# Patient Record
Sex: MALE | Race: BLACK OR AFRICAN AMERICAN | NOT HISPANIC OR LATINO | Employment: OTHER | URBAN - METROPOLITAN AREA
[De-identification: names, ages, dates, MRNs, and addresses within clinical notes are randomized per-mention and may not be internally consistent; named-entity substitution may affect disease eponyms.]

---

## 2018-03-19 ENCOUNTER — HISTORICAL (OUTPATIENT)
Dept: INTERNAL MEDICINE | Facility: CLINIC | Age: 58
End: 2018-03-19

## 2018-09-19 ENCOUNTER — HISTORICAL (OUTPATIENT)
Dept: INTERNAL MEDICINE | Facility: CLINIC | Age: 58
End: 2018-09-19

## 2019-03-20 ENCOUNTER — HISTORICAL (OUTPATIENT)
Dept: INTERNAL MEDICINE | Facility: CLINIC | Age: 59
End: 2019-03-20

## 2019-03-20 LAB
ABS NEUT (OLG): 4.54 X10(3)/MCL (ref 2.1–9.2)
ALBUMIN SERPL-MCNC: 3.6 GM/DL (ref 3.4–5)
ALBUMIN/GLOB SERPL: 0.9 RATIO (ref 1.1–2)
ALP SERPL-CCNC: 68 UNIT/L (ref 45–117)
ALT SERPL-CCNC: 19 UNIT/L (ref 12–78)
AST SERPL-CCNC: 14 UNIT/L (ref 15–37)
BASOPHILS # BLD AUTO: 0.04 X10(3)/MCL
BASOPHILS NFR BLD AUTO: 0 %
BILIRUB SERPL-MCNC: 0.3 MG/DL (ref 0.2–1)
BILIRUBIN DIRECT+TOT PNL SERPL-MCNC: <0.1 MG/DL
BILIRUBIN DIRECT+TOT PNL SERPL-MCNC: ABNORMAL MG/DL
BUN SERPL-MCNC: 21 MG/DL (ref 7–18)
CALCIUM SERPL-MCNC: 8.6 MG/DL (ref 8.5–10.1)
CHLORIDE SERPL-SCNC: 105 MMOL/L (ref 98–107)
CHOLEST SERPL-MCNC: 129 MG/DL
CHOLEST/HDLC SERPL: 2.6 {RATIO} (ref 0–5)
CO2 SERPL-SCNC: 30 MMOL/L (ref 21–32)
CREAT SERPL-MCNC: 1.2 MG/DL (ref 0.6–1.3)
EOSINOPHIL # BLD AUTO: 0.65 10*3/UL
EOSINOPHIL NFR BLD AUTO: 9 %
ERYTHROCYTE [DISTWIDTH] IN BLOOD BY AUTOMATED COUNT: 15.3 % (ref 11.5–14.5)
EST. AVERAGE GLUCOSE BLD GHB EST-MCNC: 114 MG/DL
GLOBULIN SER-MCNC: 3.9 GM/ML (ref 2.3–3.5)
GLUCOSE SERPL-MCNC: 101 MG/DL (ref 74–106)
HBA1C MFR BLD: 5.6 % (ref 4.2–6.3)
HCT VFR BLD AUTO: 45.2 % (ref 40–51)
HDLC SERPL-MCNC: 49 MG/DL
HGB BLD-MCNC: 14.3 GM/DL (ref 13.5–17.5)
IMM GRANULOCYTES # BLD AUTO: 0.04 10*3/UL
IMM GRANULOCYTES NFR BLD AUTO: 0 %
LDLC SERPL CALC-MCNC: 64 MG/DL (ref 0–130)
LYMPHOCYTES # BLD AUTO: 1.52 X10(3)/MCL
LYMPHOCYTES NFR BLD AUTO: 20 % (ref 13–40)
MCH RBC QN AUTO: 25.8 PG (ref 26–34)
MCHC RBC AUTO-ENTMCNC: 31.6 GM/DL (ref 31–37)
MCV RBC AUTO: 81.4 FL (ref 80–100)
MONOCYTES # BLD AUTO: 0.78 X10(3)/MCL
MONOCYTES NFR BLD AUTO: 10 % (ref 4–12)
NEUTROPHILS # BLD AUTO: 4.54 X10(3)/MCL
NEUTROPHILS NFR BLD AUTO: 60 X10(3)/MCL
PLATELET # BLD AUTO: 286 X10(3)/MCL (ref 130–400)
PMV BLD AUTO: 11.3 FL (ref 7.4–10.4)
POTASSIUM SERPL-SCNC: 4.2 MMOL/L (ref 3.5–5.1)
PROT SERPL-MCNC: 7.5 GM/DL (ref 6.4–8.2)
RBC # BLD AUTO: 5.55 X10(6)/MCL (ref 4.5–5.9)
SODIUM SERPL-SCNC: 139 MMOL/L (ref 136–145)
TRIGL SERPL-MCNC: 80 MG/DL
TSH SERPL-ACNC: 2.43 MIU/L (ref 0.36–3.74)
VLDLC SERPL CALC-MCNC: 16 MG/DL
WBC # SPEC AUTO: 7.6 X10(3)/MCL (ref 4.5–11)

## 2019-03-27 ENCOUNTER — HISTORICAL (OUTPATIENT)
Dept: ADMINISTRATIVE | Facility: HOSPITAL | Age: 59
End: 2019-03-27

## 2019-07-30 ENCOUNTER — HISTORICAL (OUTPATIENT)
Dept: ADMINISTRATIVE | Facility: HOSPITAL | Age: 59
End: 2019-07-30

## 2019-07-30 ENCOUNTER — HISTORICAL (OUTPATIENT)
Dept: LAB | Facility: HOSPITAL | Age: 59
End: 2019-07-30

## 2019-09-18 ENCOUNTER — HISTORICAL (OUTPATIENT)
Dept: INTERNAL MEDICINE | Facility: CLINIC | Age: 59
End: 2019-09-18

## 2019-10-17 ENCOUNTER — HISTORICAL (OUTPATIENT)
Dept: ADMINISTRATIVE | Facility: HOSPITAL | Age: 59
End: 2019-10-17

## 2019-12-03 ENCOUNTER — HISTORICAL (OUTPATIENT)
Dept: ADMINISTRATIVE | Facility: HOSPITAL | Age: 59
End: 2019-12-03

## 2020-01-07 ENCOUNTER — HISTORICAL (OUTPATIENT)
Dept: ADMINISTRATIVE | Facility: HOSPITAL | Age: 60
End: 2020-01-07

## 2020-05-05 ENCOUNTER — HISTORICAL (OUTPATIENT)
Dept: ADMINISTRATIVE | Facility: HOSPITAL | Age: 60
End: 2020-05-05

## 2020-05-29 ENCOUNTER — HISTORICAL (OUTPATIENT)
Dept: INTERNAL MEDICINE | Facility: CLINIC | Age: 60
End: 2020-05-29

## 2020-07-22 ENCOUNTER — HISTORICAL (OUTPATIENT)
Dept: URGENT CARE | Facility: CLINIC | Age: 60
End: 2020-07-22

## 2020-11-03 ENCOUNTER — HISTORICAL (OUTPATIENT)
Dept: ADMINISTRATIVE | Facility: HOSPITAL | Age: 60
End: 2020-11-03

## 2020-11-25 ENCOUNTER — HISTORICAL (OUTPATIENT)
Dept: INTERNAL MEDICINE | Facility: CLINIC | Age: 60
End: 2020-11-25

## 2021-07-02 ENCOUNTER — HISTORICAL (OUTPATIENT)
Dept: INTERNAL MEDICINE | Facility: CLINIC | Age: 61
End: 2021-07-02

## 2021-07-02 LAB
ABS NEUT (OLG): 4.71 X10(3)/MCL (ref 2.1–9.2)
ALBUMIN SERPL-MCNC: 3.7 GM/DL (ref 3.4–4.8)
ALBUMIN/GLOB SERPL: 1 RATIO (ref 1.1–2)
ALP SERPL-CCNC: 74 UNIT/L (ref 40–150)
ALT SERPL-CCNC: 21 UNIT/L (ref 0–55)
AST SERPL-CCNC: 20 UNIT/L (ref 5–34)
BASOPHILS # BLD AUTO: 0 X10(3)/MCL (ref 0–0.2)
BASOPHILS NFR BLD AUTO: 1 %
BILIRUB SERPL-MCNC: 0.3 MG/DL
BILIRUBIN DIRECT+TOT PNL SERPL-MCNC: 0.1 MG/DL (ref 0–0.8)
BILIRUBIN DIRECT+TOT PNL SERPL-MCNC: 0.2 MG/DL (ref 0–0.5)
BUN SERPL-MCNC: 19.5 MG/DL (ref 8.4–25.7)
CALCIUM SERPL-MCNC: 9.1 MG/DL (ref 8.8–10)
CHLORIDE SERPL-SCNC: 103 MMOL/L (ref 98–107)
CHOLEST SERPL-MCNC: 139 MG/DL
CHOLEST/HDLC SERPL: 3 {RATIO} (ref 0–5)
CO2 SERPL-SCNC: 28 MMOL/L (ref 23–31)
CREAT SERPL-MCNC: 1.1 MG/DL (ref 0.73–1.18)
EOSINOPHIL # BLD AUTO: 0.5 X10(3)/MCL (ref 0–0.9)
EOSINOPHIL NFR BLD AUTO: 6 %
ERYTHROCYTE [DISTWIDTH] IN BLOOD BY AUTOMATED COUNT: 15.8 % (ref 11.5–14.5)
EST. AVERAGE GLUCOSE BLD GHB EST-MCNC: 122.6 MG/DL
GLOBULIN SER-MCNC: 3.6 GM/DL (ref 2.4–3.5)
GLUCOSE SERPL-MCNC: 116 MG/DL (ref 82–115)
HBA1C MFR BLD: 5.9 %
HCT VFR BLD AUTO: 45.1 % (ref 40–51)
HDLC SERPL-MCNC: 40 MG/DL (ref 35–60)
HGB BLD-MCNC: 14.4 GM/DL (ref 13.5–17.5)
IMM GRANULOCYTES # BLD AUTO: 0.05 10*3/UL
IMM GRANULOCYTES NFR BLD AUTO: 1 %
LDLC SERPL CALC-MCNC: 86 MG/DL (ref 50–140)
LYMPHOCYTES # BLD AUTO: 1.7 X10(3)/MCL (ref 0.6–4.6)
LYMPHOCYTES NFR BLD AUTO: 22 %
MCH RBC QN AUTO: 25.9 PG (ref 26–34)
MCHC RBC AUTO-ENTMCNC: 31.9 GM/DL (ref 31–37)
MCV RBC AUTO: 81.3 FL (ref 80–100)
MONOCYTES # BLD AUTO: 0.8 X10(3)/MCL (ref 0.1–1.3)
MONOCYTES NFR BLD AUTO: 11 %
NEUTROPHILS # BLD AUTO: 4.71 X10(3)/MCL (ref 2.1–9.2)
NEUTROPHILS NFR BLD AUTO: 61 %
NRBC BLD AUTO-RTO: 0 % (ref 0–0.2)
PLATELET # BLD AUTO: 250 X10(3)/MCL (ref 130–400)
PMV BLD AUTO: 10.4 FL (ref 7.4–10.4)
POTASSIUM SERPL-SCNC: 3.6 MMOL/L (ref 3.5–5.1)
PROT SERPL-MCNC: 7.3 GM/DL (ref 5.8–7.6)
RBC # BLD AUTO: 5.55 X10(6)/MCL (ref 4.5–5.9)
SODIUM SERPL-SCNC: 139 MMOL/L (ref 136–145)
T4 FREE SERPL-MCNC: 0.84 NG/DL (ref 0.7–1.48)
TRIGL SERPL-MCNC: 64 MG/DL (ref 34–140)
TSH SERPL-ACNC: 1.88 UIU/ML (ref 0.35–4.94)
VLDLC SERPL CALC-MCNC: 13 MG/DL
WBC # SPEC AUTO: 7.8 X10(3)/MCL (ref 4.5–11)

## 2021-07-08 ENCOUNTER — HISTORICAL (OUTPATIENT)
Dept: ADMINISTRATIVE | Facility: HOSPITAL | Age: 61
End: 2021-07-08

## 2021-12-07 ENCOUNTER — HISTORICAL (OUTPATIENT)
Dept: ADMINISTRATIVE | Facility: HOSPITAL | Age: 61
End: 2021-12-07

## 2022-01-05 ENCOUNTER — HISTORICAL (OUTPATIENT)
Dept: INTERNAL MEDICINE | Facility: CLINIC | Age: 62
End: 2022-01-05

## 2022-01-05 LAB
ABS NEUT (OLG): 5.15 X10(3)/MCL (ref 2.1–9.2)
ALBUMIN SERPL-MCNC: 3.7 GM/DL (ref 3.4–4.8)
ALBUMIN/GLOB SERPL: 1 RATIO (ref 1.1–2)
ALP SERPL-CCNC: 62 UNIT/L (ref 40–150)
ALT SERPL-CCNC: 15 UNIT/L (ref 0–55)
AST SERPL-CCNC: 13 UNIT/L (ref 5–34)
BASOPHILS # BLD AUTO: 0.1 X10(3)/MCL (ref 0–0.2)
BASOPHILS NFR BLD AUTO: 1 %
BILIRUB SERPL-MCNC: 0.6 MG/DL
BILIRUBIN DIRECT+TOT PNL SERPL-MCNC: 0.3 MG/DL (ref 0–0.5)
BILIRUBIN DIRECT+TOT PNL SERPL-MCNC: 0.3 MG/DL (ref 0–0.8)
BUN SERPL-MCNC: 19.1 MG/DL (ref 8.4–25.7)
CALCIUM SERPL-MCNC: 9.6 MG/DL (ref 8.7–10.5)
CHLORIDE SERPL-SCNC: 103 MMOL/L (ref 98–107)
CHOLEST SERPL-MCNC: 127 MG/DL
CHOLEST/HDLC SERPL: 3 {RATIO} (ref 0–5)
CO2 SERPL-SCNC: 28 MMOL/L (ref 23–31)
CREAT SERPL-MCNC: 1.07 MG/DL (ref 0.73–1.18)
EOSINOPHIL # BLD AUTO: 0.4 X10(3)/MCL (ref 0–0.9)
EOSINOPHIL NFR BLD AUTO: 5 %
ERYTHROCYTE [DISTWIDTH] IN BLOOD BY AUTOMATED COUNT: 15.3 % (ref 11.5–14.5)
EST. AVERAGE GLUCOSE BLD GHB EST-MCNC: 114 MG/DL
GLOBULIN SER-MCNC: 3.6 GM/DL (ref 2.4–3.5)
GLUCOSE SERPL-MCNC: 98 MG/DL (ref 82–115)
HBA1C MFR BLD: 5.6 %
HCT VFR BLD AUTO: 45.2 % (ref 40–51)
HDLC SERPL-MCNC: 37 MG/DL (ref 35–60)
HGB BLD-MCNC: 14.5 GM/DL (ref 13.5–17.5)
IMM GRANULOCYTES # BLD AUTO: 0.03 10*3/UL
IMM GRANULOCYTES NFR BLD AUTO: 0 %
LDLC SERPL CALC-MCNC: 75 MG/DL (ref 50–140)
LYMPHOCYTES # BLD AUTO: 1.2 X10(3)/MCL (ref 0.6–4.6)
LYMPHOCYTES NFR BLD AUTO: 16 %
MCH RBC QN AUTO: 26 PG (ref 26–34)
MCHC RBC AUTO-ENTMCNC: 32.1 GM/DL (ref 31–37)
MCV RBC AUTO: 81.1 FL (ref 80–100)
MONOCYTES # BLD AUTO: 0.7 X10(3)/MCL (ref 0.1–1.3)
MONOCYTES NFR BLD AUTO: 9 %
NEUTROPHILS # BLD AUTO: 5.15 X10(3)/MCL (ref 2.1–9.2)
NEUTROPHILS NFR BLD AUTO: 69 %
NRBC BLD AUTO-RTO: 0 % (ref 0–0.2)
PLATELET # BLD AUTO: 297 X10(3)/MCL (ref 130–400)
PMV BLD AUTO: 11 FL (ref 7.4–10.4)
POTASSIUM SERPL-SCNC: 4 MMOL/L (ref 3.5–5.1)
PROT SERPL-MCNC: 7.3 GM/DL (ref 5.8–7.6)
RBC # BLD AUTO: 5.57 X10(6)/MCL (ref 4.5–5.9)
SODIUM SERPL-SCNC: 140 MMOL/L (ref 136–145)
T4 FREE SERPL-MCNC: 0.9 NG/DL (ref 0.7–1.48)
TRIGL SERPL-MCNC: 77 MG/DL (ref 34–140)
TSH SERPL-ACNC: 1.19 UIU/ML (ref 0.35–4.94)
VLDLC SERPL CALC-MCNC: 15 MG/DL
WBC # SPEC AUTO: 7.5 X10(3)/MCL (ref 4.5–11)

## 2022-01-13 ENCOUNTER — HISTORICAL (OUTPATIENT)
Dept: LAB | Facility: HOSPITAL | Age: 62
End: 2022-01-13

## 2022-01-13 LAB
ABS NEUT (OLG): 4.44 X10(3)/MCL (ref 2.1–9.2)
ALBUMIN SERPL-MCNC: 4 GM/DL (ref 3.4–4.8)
ALBUMIN/GLOB SERPL: 1.2 RATIO (ref 1.1–2)
ALP SERPL-CCNC: 68 UNIT/L (ref 40–150)
ALT SERPL-CCNC: 19 UNIT/L (ref 0–55)
APPEARANCE, UA: CLEAR
AST SERPL-CCNC: 16 UNIT/L (ref 5–34)
BASOPHILS # BLD AUTO: 0.05 X10(3)/MCL (ref 0–0.2)
BASOPHILS NFR BLD AUTO: 0.7 % (ref 0–0.9)
BILIRUB SERPL-MCNC: 0.4 MG/DL (ref 0.2–1.2)
BILIRUB UR QL STRIP: NEGATIVE
BILIRUBIN DIRECT+TOT PNL SERPL-MCNC: 0.2 MG/DL (ref 0–0.5)
BILIRUBIN DIRECT+TOT PNL SERPL-MCNC: 0.2 MG/DL (ref 0–0.8)
BUN SERPL-MCNC: 22.8 MG/DL (ref 8.4–25.7)
CALCIUM SERPL-MCNC: 9.8 MG/DL (ref 8.8–10)
CHLORIDE SERPL-SCNC: 100 MMOL/L (ref 98–107)
CO2 SERPL-SCNC: 30 MMOL/L (ref 23–31)
COLOR UR: NORMAL
CREAT SERPL-MCNC: 1.31 MG/DL (ref 0.72–1.25)
EOSINOPHIL # BLD AUTO: 0.39 X10(3)/MCL (ref 0–0.9)
EOSINOPHIL NFR BLD AUTO: 5.2 % (ref 0–6.5)
ERYTHROCYTE [DISTWIDTH] IN BLOOD BY AUTOMATED COUNT: 15.5 % (ref 11.5–17)
EST. AVERAGE GLUCOSE BLD GHB EST-MCNC: 116.9 MG/DL
GLOBULIN SER-MCNC: 3.3 GM/DL (ref 2.4–3.5)
GLUCOSE (UA): NEGATIVE
GLUCOSE SERPL-MCNC: 94 MG/DL (ref 82–115)
HBA1C MFR BLD: 5.7 %
HCT VFR BLD AUTO: 47.2 % (ref 42–52)
HGB BLD-MCNC: 15 GM/DL (ref 14–18)
HGB UR QL STRIP: NEGATIVE
IMM GRANULOCYTES # BLD AUTO: 0.03 10*3/UL (ref 0–0.02)
IMM GRANULOCYTES NFR BLD AUTO: 0.4 % (ref 0–0.43)
KETONES UR QL STRIP: NEGATIVE
LEUKOCYTE ESTERASE UR QL STRIP: NEGATIVE
LYMPHOCYTES # BLD AUTO: 1.85 X10(3)/MCL (ref 0.6–4.6)
LYMPHOCYTES NFR BLD AUTO: 24.7 % (ref 16.2–38.3)
MCH RBC QN AUTO: 26 PG (ref 27–31)
MCHC RBC AUTO-ENTMCNC: 31.8 GM/DL (ref 33–36)
MCV RBC AUTO: 81.9 FL (ref 80–94)
MONOCYTES # BLD AUTO: 0.73 X10(3)/MCL (ref 0.1–1.3)
MONOCYTES NFR BLD AUTO: 9.7 % (ref 4.7–11.3)
MRSA SCREEN BY PCR: POSITIVE
NEUTROPHILS # BLD AUTO: 4.44 X10(3)/MCL (ref 2.1–9.2)
NEUTROPHILS NFR BLD AUTO: 59.3 % (ref 49.1–73.4)
NITRITE UR QL STRIP: NEGATIVE
NRBC BLD AUTO-RTO: 0 % (ref 0–0.2)
PH UR STRIP: 5.5 [PH] (ref 5–7)
PLATELET # BLD AUTO: 288 X10(3)/MCL (ref 130–400)
PMV BLD AUTO: 9.8 FL (ref 7.4–10.4)
POTASSIUM SERPL-SCNC: 4 MMOL/L (ref 3.5–5.1)
PROT SERPL-MCNC: 7.3 GM/DL (ref 5.8–7.6)
PROT UR QL STRIP: NEGATIVE
RBC # BLD AUTO: 5.76 X10(6)/MCL (ref 4.7–6.1)
SODIUM SERPL-SCNC: 140 MMOL/L (ref 136–145)
SP GR UR STRIP: 1.01 (ref 1–1.03)
UROBILINOGEN UR STRIP-ACNC: NEGATIVE
WBC # SPEC AUTO: 7.5 X10(3)/MCL (ref 4.5–11.5)

## 2022-02-04 ENCOUNTER — HISTORICAL (OUTPATIENT)
Dept: LAB | Facility: HOSPITAL | Age: 62
End: 2022-02-04

## 2022-02-04 LAB — SARS-COV-2 AG RESP QL IA.RAPID: NEGATIVE

## 2022-02-07 ENCOUNTER — HISTORICAL (OUTPATIENT)
Dept: ADMINISTRATIVE | Facility: HOSPITAL | Age: 62
End: 2022-02-07

## 2022-02-10 ENCOUNTER — HISTORICAL (OUTPATIENT)
Dept: ADMINISTRATIVE | Facility: HOSPITAL | Age: 62
End: 2022-02-10

## 2022-02-10 LAB
ABS NEUT (OLG): 5.05 (ref 2.1–9.2)
ALBUMIN SERPL-MCNC: 2.8 G/DL (ref 3.4–4.8)
ALBUMIN/GLOB SERPL: 1 {RATIO} (ref 1.1–2)
ALP SERPL-CCNC: 50 U/L (ref 40–150)
ALT SERPL-CCNC: 8 U/L (ref 0–55)
APPEARANCE, UA: NORMAL
AST SERPL-CCNC: 21 U/L (ref 5–34)
BACTERIA SPEC CULT: NORMAL
BASOPHILS # BLD AUTO: 0 10*3/UL (ref 0–0.2)
BASOPHILS NFR BLD AUTO: 0 %
BILIRUB SERPL-MCNC: 0.5 MG/DL
BILIRUB UR QL STRIP: NEGATIVE
BILIRUBIN DIRECT+TOT PNL SERPL-MCNC: 0.2 (ref 0–0.8)
BILIRUBIN DIRECT+TOT PNL SERPL-MCNC: 0.3 (ref 0–0.5)
BUDDING YEAST: NORMAL
BUN SERPL-MCNC: 18.9 MG/DL (ref 8.4–25.7)
CALCIUM SERPL-MCNC: 8.4 MG/DL (ref 8.7–10.5)
CASTS, UA: NORMAL
CHLORIDE SERPL-SCNC: 102 MMOL/L (ref 98–107)
CO2 SERPL-SCNC: 28 MMOL/L (ref 23–31)
COLOR UR: YELLOW
CREAT SERPL-MCNC: 0.93 MG/DL (ref 0.73–1.18)
CRYSTALS: NORMAL
EOSINOPHIL # BLD AUTO: 0.4 10*3/UL (ref 0–0.9)
EOSINOPHIL NFR BLD AUTO: 5 %
ERYTHROCYTE [DISTWIDTH] IN BLOOD BY AUTOMATED COUNT: 16 % (ref 11.5–17)
GLOBULIN SER-MCNC: 2.8 G/DL (ref 2.4–3.5)
GLUCOSE (UA): NEGATIVE
GLUCOSE SERPL-MCNC: 110 MG/DL (ref 82–115)
HCT VFR BLD AUTO: 26.2 % (ref 42–52)
HEMOLYSIS INTERF INDEX SERPL-ACNC: 7
HGB BLD-MCNC: 8.3 G/DL (ref 14–18)
HGB UR QL STRIP: NEGATIVE
ICTERIC INTERF INDEX SERPL-ACNC: 1
KETONES UR QL STRIP: NEGATIVE
LEUKOCYTE ESTERASE UR QL STRIP: NEGATIVE
LIPEMIC INTERF INDEX SERPL-ACNC: <0
LYMPHOCYTES # BLD AUTO: 1.2 10*3/UL (ref 0.6–4.6)
LYMPHOCYTES NFR BLD AUTO: 15 %
MANUAL DIFF? (OHS): NO
MCH RBC QN AUTO: 25.9 PG (ref 27–31)
MCHC RBC AUTO-ENTMCNC: 31.7 G/DL (ref 33–36)
MCV RBC AUTO: 81.6 FL (ref 80–94)
MONOCYTES # BLD AUTO: 0.8 10*3/UL (ref 0.1–1.3)
MONOCYTES NFR BLD AUTO: 10 %
NEUTROPHILS # BLD AUTO: 5.05 10*3/UL (ref 2.1–9.2)
NEUTROPHILS NFR BLD AUTO: 68 %
NITRITE UR QL STRIP: NEGATIVE
PH UR STRIP: 5 [PH] (ref 5–9)
PLATELET # BLD AUTO: 196 10*3/UL (ref 130–400)
PMV BLD AUTO: 11.4 FL (ref 9.4–12.4)
POTASSIUM SERPL-SCNC: 3.8 MMOL/L (ref 3.5–5.1)
PREALB SERPL-MCNC: 9.3 (ref 16–42)
PROT SERPL-MCNC: 5.6 G/DL (ref 5.8–7.6)
PROT UR QL STRIP: NEGATIVE
RBC # BLD AUTO: 3.21 10*6/UL (ref 4.7–6.1)
RBC #/AREA URNS HPF: NORMAL /[HPF] (ref 0–2)
SMALL ROUND CELLS, UA: NORMAL
SODIUM SERPL-SCNC: 136 MMOL/L (ref 136–145)
SP GR UR STRIP: 1.01 (ref 1–1.03)
SPERM URNS QL MICRO: NORMAL
SQUAMOUS EPITHELIAL, UA: NORMAL (ref 0–4)
UROBILINOGEN UR STRIP-ACNC: 0.2
WBC # SPEC AUTO: 7.4 10*3/UL (ref 4.5–11.5)
WBC #/AREA URNS HPF: NORMAL /[HPF] (ref 0–2)

## 2022-02-11 ENCOUNTER — HISTORICAL (OUTPATIENT)
Dept: ADMINISTRATIVE | Facility: HOSPITAL | Age: 62
End: 2022-02-11

## 2022-02-11 LAB
ABS NEUT (OLG): 5.07 (ref 2.1–9.2)
BASOPHILS # BLD AUTO: 0 10*3/UL (ref 0–0.2)
BASOPHILS NFR BLD AUTO: 0 %
EOSINOPHIL # BLD AUTO: 0.5 10*3/UL (ref 0–0.9)
EOSINOPHIL NFR BLD AUTO: 7 %
ERYTHROCYTE [DISTWIDTH] IN BLOOD BY AUTOMATED COUNT: 16.2 % (ref 11.5–17)
HCT VFR BLD AUTO: 26 % (ref 42–52)
HGB BLD-MCNC: 8 G/DL (ref 14–18)
LYMPHOCYTES # BLD AUTO: 1.4 10*3/UL (ref 0.6–4.6)
LYMPHOCYTES NFR BLD AUTO: 17 %
MANUAL DIFF? (OHS): NO
MCH RBC QN AUTO: 25.6 PG (ref 27–31)
MCHC RBC AUTO-ENTMCNC: 30.8 G/DL (ref 33–36)
MCV RBC AUTO: 83.3 FL (ref 80–94)
MONOCYTES # BLD AUTO: 1 10*3/UL (ref 0.1–1.3)
MONOCYTES NFR BLD AUTO: 12 %
NEUTROPHILS # BLD AUTO: 5.07 10*3/UL (ref 2.1–9.2)
NEUTROPHILS NFR BLD AUTO: 63 %
PLATELET # BLD AUTO: 220 10*3/UL (ref 130–400)
PMV BLD AUTO: 11.6 FL (ref 9.4–12.4)
RBC # BLD AUTO: 3.12 10*6/UL (ref 4.7–6.1)
WBC # SPEC AUTO: 8 10*3/UL (ref 4.5–11.5)

## 2022-02-14 ENCOUNTER — HISTORICAL (OUTPATIENT)
Dept: ADMINISTRATIVE | Facility: HOSPITAL | Age: 62
End: 2022-02-14

## 2022-02-14 LAB
ABS NEUT (OLG): 4.88 (ref 2.1–9.2)
BASOPHILS # BLD AUTO: 0 10*3/UL (ref 0–0.2)
BASOPHILS NFR BLD AUTO: 0 %
EOSINOPHIL # BLD AUTO: 0.6 10*3/UL (ref 0–0.9)
EOSINOPHIL NFR BLD AUTO: 8 %
ERYTHROCYTE [DISTWIDTH] IN BLOOD BY AUTOMATED COUNT: 15.9 % (ref 11.5–17)
HCT VFR BLD AUTO: 27 % (ref 42–52)
HGB BLD-MCNC: 8.6 G/DL (ref 14–18)
LYMPHOCYTES # BLD AUTO: 1.5 10*3/UL (ref 0.6–4.6)
LYMPHOCYTES NFR BLD AUTO: 18 %
MANUAL DIFF? (OHS): NO
MCH RBC QN AUTO: 26.1 PG (ref 27–31)
MCHC RBC AUTO-ENTMCNC: 31.9 G/DL (ref 33–36)
MCV RBC AUTO: 81.8 FL (ref 80–94)
MONOCYTES # BLD AUTO: 0.8 10*3/UL (ref 0.1–1.3)
MONOCYTES NFR BLD AUTO: 10 %
NEUTROPHILS # BLD AUTO: 4.88 10*3/UL (ref 2.1–9.2)
NEUTROPHILS NFR BLD AUTO: 61 %
PLATELET # BLD AUTO: 377 10*3/UL (ref 130–400)
PMV BLD AUTO: 10.5 FL (ref 9.4–12.4)
RBC # BLD AUTO: 3.3 10*6/UL (ref 4.7–6.1)
WBC # SPEC AUTO: 8 10*3/UL (ref 4.5–11.5)

## 2022-04-09 ENCOUNTER — HISTORICAL (OUTPATIENT)
Dept: ADMINISTRATIVE | Facility: HOSPITAL | Age: 62
End: 2022-04-09
Payer: MEDICARE

## 2022-04-25 VITALS
HEIGHT: 69 IN | SYSTOLIC BLOOD PRESSURE: 117 MMHG | OXYGEN SATURATION: 98 % | WEIGHT: 241 LBS | DIASTOLIC BLOOD PRESSURE: 79 MMHG | BODY MASS INDEX: 35.7 KG/M2

## 2022-05-02 NOTE — HISTORICAL [facility] CERNER
This is a historical note converted from Katharina. Formatting and pictures may have been removed.  Please reference Katharina for original formatting and attached multimedia. Chief Complaint  B/L KNEE PAIN HE HAD A ?LEFT TKR DONE IN [place] IN 2016.  History of Present Illness  58-year-old male presents today for evaluation of painful left knee as well as right knee pain. ?Patient history of osteoarthritis of the right knee. ?Is not any injections in some time. ?Has significant pain with ambulation to his right side as well as popping and locking.? He is requesting an injection to his right knee today?for his arthritis there.? His other complaint is a left knee and is?history there is much more complicated. ?He has history of a total knee arthroplasty?with a leg infection. ?Patient was treated with a staged revision to his left knee.? After his surgery at some point?something broke per the patient. ?This was treated with revision of the surgery with fixation.? Since that time is been unable to straighten his leg. ?His leg is more or less fixed in a bent position.? He walks with a walker due to this.  Review of Systems  Denies fevers, chills, chest pain, shortness of breath. Comprehensive review of systems performed and otherwise negative except as noted in HPI.  Physical Exam  Vitals & Measurements  T:?97.3? ?F (Oral)? BP:?119/73?  HT:?172?cm? WT:?108.40?kg? BMI:?36.64?  General: No acute distress, alert and oriented, healthy appearing?  HEENT: Head is atraumatic, mucous membranes are moist  Neck: Supples, no JVD  Cardiovascular: Palpable dorsalis pedis and posterior tibial pulses, regular rate and rhythm to those pulses  Lungs: Breathing non-labored  Skin: no rashes appreciated  Neurologic: Can flex and extend knees, ankles, and toes. Sensation is grossly intact  ?  Right knee:  Range of motion of his right knee is from . ?He has significant crepitus with range of motion of his right knee.? Sensation is intact  distally to the foot. ?Overall valgus alignment that is not correctable.  ?  Left knee  Has well-healed midline incision. ?Brisk cap refill distally to his?toes.? Range of motion of his left knee is very poor. ?He has a 60 degree flexion contracture. ?This is active. ?He is about 45 degrees flexion contracture passively. ?He can flex to roughly 100.  Assessment/Plan  1.?Primary osteoarthritis of right knee?M17.11  ?Patient end-stage arthritis the right knee. ?We discussed all treatment options. ?He is more symptomatically in his right knee currently. ?We will start with an injection?although he may be a candidate for arthroplasty failed conservative measures.  ?  After verbal consent was obtained the patients right knee was prepped with Betadine and anesthetized with ethyl chloride. The right knee joint was then injected under sterile technique with 2 mL of 2% lidocaine and betamethasone it was dressed with a Band-Aid. The patient received good relief from the injection and was able to ambulate normally from clinic.  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 ?Simply tore his patella tendon. ?His implants appeared likely may be loose as well.? His reconstruction option for this are extremely complex and had a long discussion about this today.? We will start off work him up for infection with lab work. ?We will see him back next week to go over the results of this and possibly aspirate his left knee.  Ordered:  Office/Outpatient Visit Level 3 New [id number] PC, Primary osteoarthritis of right knee  History of revision of total replacement of  left knee joint  Mechanical loosening of internal left knee prosthetic joint, [facility], 07/30/19 9:49:00 CDT  ?  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  Orders:  XR Knee Left 1 or 2 Views, Routine, 07/30/19 9:27:00 CDT, Pain, None, Stretcher, Patient Has IV?, M25.569, Not Scheduled, 07/30/19 9:27:00 CDT  XR Knee Right 1 or 2 Views, Routine, 07/30/19 9:28:00 CDT, Pain, None, Stretcher, Patient Has IV?, M25.569, Not Scheduled, 07/30/19 9:28:00 CDT  Referrals  Clinic Follow up, *Est. 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  Hyperlipidemia(  Confirmed  )  Hypertension  Mechanical loosening of internal left knee prosthetic joint  Morbid obesity(  Probable Diagnosis  )  Primary osteoarthritis of right knee  Rash(  Confirmed  )  Stroke  Tobacco user  Tobacco user(  Probable Diagnosis  )  Historical  CVA (cerebral infarction)  Procedure/Surgical History  Biopsy Gastrointestional (12/08/2016)  Colonoscopy (12/08/2016)  Colonoscopy, flexible; with removal of tumor(s), polyp(s), or other lesion(s) by hot biopsy forceps (12/08/2016)  Esophagogastroduodenoscopy (12/08/2016)  Esophagogastroduodenoscopy, flexible, transoral; with biopsy, single or multiple (12/08/2016)  Excision of Large Intestine, Via Natural or Artificial Opening Endoscopic, Diagnostic (12/08/2016)  Excision of Stomach, Pylorus, Via Natural or  Artificial Opening Endoscopic, Diagnostic (12/08/2016)  Total knee replacement 29-SEP-2015 00:29:24<$> (06/04/2008)  Endoscopic total meniscectomy of knee   Medications  aspirin 325 mg oral tablet, 325 mg= 1 tab(s), Oral, Daily, 4 refills  atenolol 25 mg oral tablet, See Instructions, 3 refills  Celestone, 12 mg, Intra-Articular, Once  cetirizine 10 mg oral tablet, See Instructions, 10 refills  clopidogrel 75 mg oral tablet, See Instructions, 3 refills  hydrochlorothiazide-lisinopril 25 mg-20 mg oral tablet, See Instructions, 3 refills  lidocaine 2% injectable solution, 2 mL, Intra-Articular, Once  pravastatin 20 mg oral tablet, See Instructions, 3 refills  Allergies  No Known Allergies  Social History  Abuse/Neglect  No, 07/30/2019  Alcohol  Current, Beer, 1-2 times per month, Started age 16 Years. Previous treatment: None. Alcohol use interferes with work or home: No. Drinks more than intended: No. Others hurt by drinking: No. Ready to change: No. Household alcohol concerns: No., 10/19/2015  Employment/School  disability, 03/07/2016  disability, Highest education level: High school., 02/28/2015  Exercise  Exercise frequency: Daily. Self assessment: Fair condition., 02/28/2015  Home/Environment  Lives with Mother. Living situation: Home/Independent. Home equipment: Walker/Cane, B/P CUFF . Alcohol abuse in household: No. Substance abuse in household: No. Smoker in household: Yes. Injuries/Abuse/Neglect in household: No. Feels unsafe at home: No. Family/Friends available for support: Yes. Concern for family members at home: No. Major illness in household: No. Financial concerns: No., 10/19/2015  Nutrition/Health  Regular, Caffeine intake amount: 2 CANS OF DIET COKE. Wants to lose weight: Yes. Sleeping concerns: No. Feels highly stressed: No., 10/19/2015  Sexual  Gender Identity Identifies as male., 06/06/2019  Substance Use  Past, Cocaine, 02/28/2015  Tobacco  5-9 cigarettes (between 1/4 to 1/2 pack)/day in last 30  days, Yes, Household tobacco concerns: No., 07/30/2019  Family History  Alzheimers disease: Father.  Cancer: Other.  Diabetes mellitus type 2: Father.  Heart disease: Brother.  Stroke: Brother.  Health Maintenance  Health Maintenance  ???Pending?(in the next year)  ??? ??OverDue  ??? ? ? ?Coronary Artery Disease Maintenance-Antiplatelet Agent Prescribed due??and every?  ??? ? ? ?Coronary Artery Disease Maintenance-Lipid Lowering Therapy due??and every?  ??? ? ? ?Diabetes Screening due??and every?  ??? ? ? ?HF-Heart Failure Education due??12/01/17??and every 1??year(s)  ??? ? ? ?Aspirin Therapy for CVD Prevention due??04/05/18??and every 1??year(s)  ??? ? ? ?Smoking Cessation (Coronary Artery Disease) due??09/08/18??and every 2??year(s)  ??? ? ? ?Alcohol Misuse Screening due??01/01/19??and every 1??year(s)  ??? ??Due?  ??? ? ? ?HF-LVEF due??07/30/19??and every 2??year(s)  ??? ? ? ?Lung Cancer Screening due??07/30/19??and every 1??year(s)  ??? ? ? ?Tetanus Vaccine due??07/30/19??and every 10??year(s)  ??? ? ? ?Zoster Vaccine due??07/30/19??and every 100??year(s)  ??? ??Due In Future?  ??? ? ? ?Obesity Screening not due until??01/01/20??and every 1??year(s)  ??? ? ? ?Smoking Cessation not due until??01/01/20??and every 1??year(s)  ??? ? ? ?Hypertension Management-BMP not due until??03/19/20??and every 1??year(s)  ??? ? ? ?Depression Screening not due until??03/26/20??and every 1??year(s)  ??? ? ? ?ADL Screening not due until??06/06/20??and every 1??year(s)  ??? ? ? ?Blood Pressure Screening not due until??07/29/20??and every 1??year(s)  ??? ? ? ?Body Mass Index Check not due until??07/29/20??and every 1??year(s)  ??? ? ? ?Hypertension Management-Blood Pressure not due until??07/29/20??and every 1??year(s)  ???Satisfied?(in the past 1 year)  ??? ??Satisfied?  ??? ? ? ?ADL Screening on??06/06/19.??Satisfied by Cristal Wood RN  ??? ? ? ?Blood Pressure Screening on??07/30/19.??Satisfied by David Calvillo  ??? ? ? ?Body  Mass Index Check on??07/30/19.??Satisfied by David Calvillo  ??? ? ? ?Coronary Artery Disease Maintenance-Antiplatelet Agent Prescribed on??04/12/19.??Satisfied by Ariela Lyle MD  ??? ? ? ?Depression Screening on??03/27/19.??Satisfied by Ben Torrez LPN  ??? ? ? ?Diabetes Screening on??03/20/19.??Satisfied by Robert Isaac Jr.  ??? ? ? ?Hypertension Management-Blood Pressure on??07/30/19.??Satisfied by David Calvillo  ??? ? ? ?Influenza Vaccine on??09/27/18.??Satisfied by Liza CATHERINE, Africa WHYTE  ??? ? ? ?Lipid Screening on??03/20/19.??Satisfied by Robert Isaac Jr.  ??? ? ? ?Obesity Screening on??07/30/19.??Satisfied by David Calvillo  ??? ? ? ?Smoking Cessation on??07/30/19.??Satisfied by David Calvillo  ?  Diagnostic Results  AP lateral?bilateral knees reviewed. ?Patient is arthritis of right knee with loss of joint space and bone-on-bone articulation. ?AP lateral left knee shows a revision prosthesis?with?significant amount of patella melva?as well as radiolucencies around his femoral as well as tibial components.

## 2022-05-02 NOTE — HISTORICAL [facility] CERNER
This is a historical note converted from Katharina. Formatting and pictures may have been removed.  Please reference Katharina for original formatting and attached multimedia. Chief Complaint  refills on all meds thinks he has a cold congestion and a nasal drip wants ortho referral  History of Present Illness  59 yo bmwith htn, hx cva chol, obesity arthritis of knees  Review of Systems  neg cv, ne pulm, neg gi  Physical Exam  Vitals & Measurements  T:?36.4? ?C (Oral)? HR:?70(Peripheral)? RR:?18? BP:?128/82?  HT:?172?cm? WT:?101.5?kg? BMI:?34.31?  aax4 nad  camila eomi  cv rrr s1s2 no mgr  lungs cta no cr or whz  abd nt soft  ext no edema  neuro intact  Assessment/Plan  1.?Arthritis  ? ortho  Ordered:  CBC w/ Auto Diff  Clinic Follow up  Comprehensive Metabolic Panel  Hemoglobin A1C [facility]  Internal Referral to Orthopedics  Lipid Panel  Office/Outpatient Visit Level 4 Established [id number] PC  Thyroid Stimulating Hormone  XR Knee Left 4 or More Views  XR Knee Right 4 or More Views  ?  2.?Hyperlipidemia(  Confirmed  )  ? flp  Ordered:  CBC w/ Auto Diff  Clinic Follow up  Comprehensive Metabolic Panel  Hemoglobin A1C [facility]  Internal Referral to Orthopedics  Lipid Panel  Office/Outpatient Visit Level 4 Established [id number] PC  Thyroid Stimulating Hormone  XR Knee Left 4 or More Views  XR Knee Right 4 or More Views  ?  3.?Hypertension  ? controlled  Ordered:  CBC w/ Auto Diff  Clinic Follow up  Comprehensive Metabolic Panel  Hemoglobin A1C [facility]  Internal Referral to Orthopedics  Lipid Panel  Office/Outpatient Visit Level 4 Established [id number] PC  Thyroid Stimulating Hormone  XR Knee Left 4 or More Views  XR Knee Right 4 or More Views  ?  4.?Stroke  ? plavix  Ordered:  CBC w/ Auto Diff  Clinic Follow up  Comprehensive Metabolic Panel  Hemoglobin A1C [facility]  Internal Referral to Orthopedics  Lipid Panel  Office/Outpatient Visit Level 4 Established [id number] PC  Thyroid Stimulating Hormone  XR Knee Left 4 or More Views  XR Knee Right 4 or More  Views  ?   Problem List/Past Medical History  Ongoing  Arthritis  Hyperlipidemia(  Confirmed  )  Hypertension  Morbid obesity(  Probable Diagnosis  )  Rash(  Confirmed  )  Stroke  Tobacco user  Tobacco user(  Probable Diagnosis  )  Historical  CVA (cerebral infarction)  Procedure/Surgical History  Biopsy Gastrointestional (12/08/2016)  Colonoscopy (12/08/2016)  Colonoscopy, flexible; with removal of tumor(s), polyp(s), or other lesion(s) by hot biopsy forceps (12/08/2016)  Esophagogastroduodenoscopy (12/08/2016)  Esophagogastroduodenoscopy, flexible, transoral; with biopsy, single or multiple (12/08/2016)  Excision of Large Intestine, Via Natural or Artificial Opening Endoscopic, Diagnostic (12/08/2016)  Excision of Stomach, Pylorus, Via Natural or Artificial Opening Endoscopic, Diagnostic (12/08/2016)  Total knee replacement 29-SEP-2015 00:29:24<$> (06/04/2008)  Endoscopic total meniscectomy of knee   Medications  aspirin 325 mg oral tablet, 325 mg= 1 tab(s), Oral, Daily, 4 refills  atenolol 25 mg oral tablet, 25 mg= 1 tab(s), Oral, Daily, 3 refills  hydrochlorothiazide-lisinopril 25 mg-20 mg oral tablet, 1 tab(s), Oral, Daily, 3 refills  Plavix 75 mg oral tablet, 75 mg= 1 tab(s), Oral, Daily, 3 refills  pravastatin 20 mg oral tablet, 20 mg= 1 tab(s), Oral, Daily, 3 refills  Zyrtec 10 mg oral tablet, 10 mg= 1 tab(s), Oral, Daily, PRN, 11 refills  Allergies  No Known Allergies  Social History  Alcohol  Current, Beer, 1-2 times per month, Started age 16 Years. Previous treatment: None. Alcohol use interferes with work or home: No. Drinks more than intended: No. Others hurt by drinking: No. Ready to change: No. Household alcohol concerns: No., 10/19/2015  Employment/School  disability, 03/07/2016  disability, Highest education level: High school., 02/28/2015  Exercise  Exercise frequency: Daily. Self assessment: Fair condition., 02/28/2015  Home/Environment  Lives with Mother. Living situation:  Home/Independent. Home equipment: Walker/Cane, B/P CUFF . Alcohol abuse in household: No. Substance abuse in household: No. Smoker in household: Yes. Injuries/Abuse/Neglect in household: No. Feels unsafe at home: No. Family/Friends available for support: Yes. Concern for family members at home: No. Major illness in household: No. Financial concerns: No., 10/19/2015  Nutrition/Health  Regular, Caffeine intake amount: 2 CANS OF DIET COKE. Wants to lose weight: Yes. Sleeping concerns: No. Feels highly stressed: No., 10/19/2015  Substance Abuse  Past, Cocaine, 02/28/2015  Tobacco  10 or more cigarettes (1/2 pack or more)/day in last 30 days, Yes, 03/27/2019  Family History  Alzheimers disease: Father.  Cancer: Other.  Diabetes mellitus type 2: Father.  Heart disease: Brother.  Stroke: Brother.  Health Maintenance  Health Maintenance  ???Pending?(in the next year)  ??? ??OverDue  ??? ? ? ?Coronary Artery Disease Maintenance-Antiplatelet Agent Prescribed due??and every?  ??? ? ? ?Coronary Artery Disease Maintenance-Lipid Lowering Therapy due??and every?  ??? ? ? ?Diabetes Screening due??and every?  ??? ? ? ?HF-Heart Failure Education due??12/01/17??and every 1??year(s)  ??? ? ? ?Aspirin Therapy for CVD Prevention due??04/05/18??and every 1??year(s)  ??? ? ? ?Smoking Cessation (Coronary Artery Disease) due??09/08/18??and every 2??year(s)  ??? ??Due?  ??? ? ? ?Smoking Cessation due??03/27/19??and every 1??year(s)  ??? ? ? ?Alcohol Misuse Screening due??03/27/19??and every 1??year(s)  ??? ? ? ?HF-LVEF due??03/27/19??and every 2??year(s)  ??? ? ? ?Lung Cancer Screening due??03/27/19??and every 1??year(s)  ??? ? ? ?Tetanus Vaccine due??03/27/19??and every 10??year(s)  ??? ? ? ?Zoster Vaccine due??03/27/19??and every 100??year(s)  ??? ??Due In Future?  ??? ? ? ?Hypertension Management-BMP not due until??03/19/20??and every 1??year(s)  ??? ? ? ?Blood Pressure Screening not due until??03/26/20??and every 1??year(s)  ??? ? ? ?Body  Mass Index Check not due until??03/26/20??and every 1??year(s)  ??? ? ? ?Depression Screening not due until??03/26/20??and every 1??year(s)  ??? ? ? ?Hypertension Management-Blood Pressure not due until??03/26/20??and every 1??year(s)  ???Satisfied?(in the past 1 year)  ??? ??Satisfied?  ??? ? ? ?ADL Screening on??03/27/19.??Satisfied by Torrez LPN, Ben Aerial  ??? ? ? ?Blood Pressure Screening on??03/27/19.??Satisfied by Torrez LPN, Ben Aerial  ??? ? ? ?Body Mass Index Check on??03/27/19.??Satisfied by Torrez LPN, Ben Aerial  ??? ? ? ?Coronary Artery Disease Maintenance-Lipid Lowering Therapy on??03/27/18.??Satisfied by Ariela Lyle MD  ??? ? ? ?Depression Screening on??03/27/19.??Satisfied by Torrez LPSERGIO, Ben Aerial  ??? ? ? ?Diabetes Screening on??03/20/19.??Satisfied by Robert Isaac Jr.  ??? ? ? ?Hypertension Management-Blood Pressure on??03/27/19.??Satisfied by Torrez LPSERGIO, Ben Aerial  ??? ? ? ?Influenza Vaccine on??09/27/18.??Satisfied by Africa De Jesus RN  ??? ? ? ?Lipid Screening on??03/20/19.??Satisfied by Robert Isaac Jr.  ??? ? ? ?Obesity Screening on??03/27/19.??Satisfied by Torrez LPSERGIO, Ben Aerial  ??? ? ? ?Smoking Cessation on??03/27/18.??Satisfied by Alicia Lepe LPN  ?  ?

## 2022-05-02 NOTE — HISTORICAL [facility] CERNER
This is a historical note converted from [facility]ar[facility]. Formatting and pictures may have been removed.  Please reference Katharina for original formatting and attached multimedia. Chief Complaint  5 month follow up right total knee replacement patella issues 11/11/19, Patient said his knee keep popping out of place  History of Present Illness  61-year-old male presents today for follow-up of painful toenail left side. ?Patient history of right knee?arthroplasty?and is doing fairly well with this. ?Does continue to have some?patellar instability although this is managed and is happy with this.? Patient is a history of multiple surgery to the left knee.? Most recently with fairly revision?hinged component with a?extensor mechanism disruption. ?He continues to have worsening pain and feels of the left leg?is having worsening motion and decreased function. ?He is unable to ambulate on left leg due to the fixed flexion contracture.  Review of Systems  Denies fevers, chills, chest pain, shortness of breath. Comprehensive review of systems performed and otherwise negative except as noted in HPI.  Physical Exam  Vitals & Measurements  T:?98.5? ?F (Oral)? HR:?77(Peripheral)? BP:?106/75?  HT:?174.00?cm? WT:?116.000?kg? BMI:?38.31?  General: No acute distress, alert and oriented, healthy appearing?  HEENT: Head is atraumatic, mucous membranes are moist?  Cardiovascular: Brisk capillary refill  Lungs: Breathing non-labored?  Skin: no rashes appreciated?  Neurologic: Sensation is grossly intact distally  ?  Left knee:  Patient with a midline incision that is healed well. ?There is minimal no effusion.? Does have active flexion. ?No active extension.? Range of motion of the knee is from .  Assessment/Plan  1.?Mechanical loosening of internal left knee prosthetic joint?T84.033A  ?Discussed all treatment on for the patients left knee. ?He was previously worked up for infection this was found to be negative.? We did discuss revision of  this knee with reconstruction of his extensor mechanism. ?We also discussed eventual fusion. ?At this point the patient would like to hold off on extensor mechanism reconstruction is in favor of fusion. ?Patient states that?he was happy with his knee when it was straight?during this time an antibiotic spacer.? The risk, benefits, alternatives to left knee revision?with fusion were discussed in detail. ?All questions answered to satisfaction. ?No guarantees made. ?Despite his risk elected proceed with surgical intervention.  Ordered:  XR Knee Left 3 Views, Routine, 12/07/21 9:56:00 CST, None, Patient Bed, Patient Has IV?, T84.033A  M17.11, Not Scheduled, 12/07/21 9:56:00 CST  ?   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  Hyperlipidemia(  Confirmed  )  Hypertension  Mechanical loosening of internal left knee prosthetic joint  Morbid obesity(  Probable Diagnosis  )  Pre-op exam  Primary osteoarthritis of right knee  Stroke  Tobacco user  Tobacco user(  Probable Diagnosis  )  Historical  CVA (cerebral infarction)  Rash(  Confirmed  )  Procedure/Surgical History  SHIV VICTORIA Total Knee Arthroplasty (Right) (11/11/2019)  Replacement of Right Knee Joint with Synthetic Substitute, Uncemented, Open Approach (11/11/2019)  Biopsy Gastrointestional (12/08/2016)  Colonoscopy (12/08/2016)  Colonoscopy, flexible; with removal of tumor(s), polyp(s), or other lesion(s) by hot biopsy forceps (12/08/2016)  Esophagogastroduodenoscopy (12/08/2016)  Esophagogastroduodenoscopy, flexible, transoral; with biopsy, single or multiple (12/08/2016)  Excision of Large Intestine, Via Natural or Artificial Opening Endoscopic, Diagnostic (12/08/2016)  Excision of Stomach, Pylorus, Via Natural or Artificial Opening Endoscopic, Diagnostic (12/08/2016)  Total knee replacement 29-SEP-2015 00:29:24<$> (06/04/2008)  Endoscopic total meniscectomy of knee   Medications  aspirin  81 mg oral Delayed Release (EC) tablet, 81 mg= 1 tab(s), Oral, BID  atenolol 25 mg oral tablet, See Instructions, 3 refills  cetirizine 10 mg oral tablet, See Instructions, 3 refills  clopidogrel 75 mg oral tablet, See Instructions, 3 refills  fluocinonide 0.05% topical cream, TOP, TID  gabapentin 300 mg oral capsule, 300 mg= 1 cap(s), Oral, TID,? ?Unable to obtain  hydrochlorothiazide-lisinopril 25 mg-20 mg oral tablet, See Instructions, 3 refills  pravastatin 20 mg oral tablet, See Instructions, 3 refills  Allergies  No Known Allergies  Social History  Abuse/Neglect  No, No, Yes, 12/07/2021  Alcohol  Past, 12/07/2021  Current, Beer, 1-2 times per month, 08/27/2019  Employment/School  disability, 03/07/2016  disability, Highest education level: High school., 02/28/2015  Exercise  Exercise frequency: Daily. Self assessment: Fair condition., 02/28/2015  Home/Environment  Lives with Mother. Living situation: Home/Independent. Home equipment: Walker/Cane, B/P CUFF . Alcohol abuse in household: No. Substance abuse in household: No. Smoker in household: Yes. Injuries/Abuse/Neglect in household: No. Feels unsafe at home: No. Family/Friends available for support: Yes. Concern for family members at home: No. Major illness in household: No. Financial concerns: No., 10/19/2015  Nutrition/Health  Regular, Caffeine intake amount: 2 CANS OF DIET COKE. Wants to lose weight: Yes. Sleeping concerns: No. Feels highly stressed: No., 10/19/2015  Sexual  Gender Identity Identifies as male., 06/06/2019  Substance Use  Past, Cocaine, 02/28/2015  Tobacco  10 or more cigarettes (1/2 pack or more)/day in last 30 days, Yes, 12/07/2021  Family History  Alzheimers disease: Father.  Cancer: Other.  Diabetes mellitus type 2: Father.  Heart disease: Brother.  Stroke: Brother.  Immunizations  Vaccine Date Status   COVID-19 MRNA, LNP-S, PF- Pfizer 10/20/2021 Given   COVID-19 MRNA, LNP-S, PF- Pfizer 03/17/2021 Given   COVID-19 MRNA, LNP-S, PF-  Pfizer 02/24/2021 Given   Health Maintenance  Health Maintenance  ???Pending?(in the next year)  ??? ??OverDue  ??? ? ? ?Aspirin Therapy for CVD Prevention due??11/13/20??and every 1??year(s)  ??? ? ? ?Smoking Cessation due??01/01/21??and every 1??year(s)  ??? ? ? ?Alcohol Misuse Screening due??01/02/21??and every 1??year(s)  ??? ??Due?  ??? ? ? ?Lung Cancer Screening due??12/07/21??and every 1??year(s)  ??? ? ? ?Medicare Annual Wellness Exam due??12/07/21??and every 1??year(s)  ??? ? ? ?Zoster Vaccine due??12/07/21??Unknown Frequency  ??? ??Refused?  ??? ? ? ?Tetanus Vaccine due??12/07/21??and every 10??year(s)  ??? ??Due In Future?  ??? ? ? ?Obesity Screening not due until??01/01/22??and every 1??year(s)  ??? ? ? ?Diabetes Screening not due until??07/02/22??and every 1??year(s)  ??? ? ? ?Hypertension Management-BMP not due until??07/02/22??and every 1??year(s)  ??? ? ? ?Blood Pressure Screening not due until??07/13/22??and every 1??year(s)  ??? ? ? ?Body Mass Index Check not due until??07/13/22??and every 1??year(s)  ??? ? ? ?Depression Screening not due until??07/13/22??and every 1??year(s)  ??? ? ? ?Hypertension Management-Blood Pressure not due until??07/13/22??and every 1??year(s)  ??? ? ? ?ADL Screening not due until??07/13/22??and every 1??year(s)  ???Satisfied?(in the past 1 year)  ??? ??Satisfied?  ??? ? ? ?ADL Screening on??07/13/21.??Satisfied by Ben Torrez LPN [facility]  ??? ? ? ?Blood Pressure Screening on??12/07/21.??Satisfied by Pam Lenz  ??? ? ? ?Body Mass Index Check on??12/07/21.??Satisfied by Pam Lenz  ??? ? ? ?Coronary Artery Disease Maintenance-Lipid Lowering Therapy on??01/12/21.??Satisfied by Ariela Lyle MD  ??? ? ? ?Depression Screening on??07/13/21.??Satisfied by Ben Torrez LPN [facility]  ??? ? ? ?Diabetes Screening on??07/02/21.??Satisfied by Carmela Basilio  ??? ? ? ?Hypertension Management-Blood Pressure on??12/07/21.??Satisfied by Pam Lenz  ??? ? ? ?Influenza Vaccine  on??12/07/21.??Satisfied by Pam Lenz  ??? ? ? ?Lipid Screening on??07/02/21.??Satisfied by Carmela Basilio  ??? ? ? ?Obesity Screening on??12/07/21.??Satisfied by Pam Lenz  ?  Diagnostic Results  AP lateral left knee reviewed. ?Patient with?radiolucency around femoral and tibial components of the left knee.

## 2022-05-02 NOTE — HISTORICAL [facility] CERN[facility]
This is a historical note converted from [facility]ar[facility]. Formatting and pictures may have been removed.  Please reference Katharina for original formatting and attached multimedia. Chief Complaint  6 month flu Rt TKA sx 11/11/19  History of Present Illness  58-year-old male presents here with follow-up of right total knee arthroplasty. ?Patient initially well with his right knee and is happy with his recovery thus far. ?Does have some very mild patella instability that is not significant affecting him. ?Also complaining of ongoing pain to his left knee. ?He has had multiple issues with this in the past.  Review of Systems  Denies fevers, chills, chest pain, shortness of breath. Comprehensive review of systems performed and otherwise negative except as noted in HPI.  Physical Exam  Vitals & Measurements  T:?36.0? ?C (Oral)? HR:?72(Peripheral)? BP:?109/67?  HT:?174.00?cm? WT:?111.900?kg? BMI:?36.96?  General: No acute distress, alert and oriented, healthy appearing?  HEENT: Head is atraumatic, mucous membranes are moist?  Cardiovascular: Brisk capillary refill  Lungs: Breathing non-labored?  Skin: no rashes appreciated?  Neurologic: Sensation is grossly intact distally  ?  Right knee:  Full extension. ?Flexion to?115. ?Patient does have?a tilted patella although not frankly dislocated today.  ?  Left knee:  Left knee continues to be very stiff. ?He has?really a fixed?flexion contracture of about 90 degrees.? No significant?flexion. ?Extensor mechanism is disrupted chronically.  Assessment/Plan  1.?Mechanical loosening of internal left knee prosthetic joint?T84.033A  ?Left knee is loose and failed. ?He has history of getting this redone at some point time. ?I think a reconstruction of this?would be fraught with complications.? He has a multiply revised knee as well as extensor mechanism disruption?and a fairly large flexion contracture.? We also discussed?knee fusion?as well as an amputation. ?Plan to repeat an x-ray of his left  knee at next appointment to discuss further treatment options as needed.  2.?Primary osteoarthritis of right knee?M17.11  ?Patients right knee is doing fairly well. ?Does have a mild amount of patellar instability although this is not significant limiting him or affecting his daily life and he is happy with his recovery of his right knee.? We will see him back this on as-needed basis.   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  Hyperlipidemia(  Confirmed  )  Hypertension  Mechanical loosening of internal left knee prosthetic joint  Morbid obesity(  Probable Diagnosis  )  Pre-op exam  Primary osteoarthritis of right knee  Stroke  Tobacco user  Tobacco user(  Probable Diagnosis  )  Historical  CVA (cerebral infarction)  Rash(  Confirmed  )  Procedure/Surgical History  SHIV VICTORIA Total Knee Arthroplasty (Right) (11/11/2019)  Replacement of Right Knee Joint with Synthetic Substitute, Uncemented, Open Approach (11/11/2019)  Biopsy Gastrointestional (12/08/2016)  Colonoscopy (12/08/2016)  Colonoscopy, flexible; with removal of tumor(s), polyp(s), or other lesion(s) by hot biopsy forceps (12/08/2016)  Esophagogastroduodenoscopy (12/08/2016)  Esophagogastroduodenoscopy, flexible, transoral; with biopsy, single or multiple (12/08/2016)  Excision of Large Intestine, Via Natural or Artificial Opening Endoscopic, Diagnostic (12/08/2016)  Excision of Stomach, Pylorus, Via Natural or Artificial Opening Endoscopic, Diagnostic (12/08/2016)  Total knee replacement 29-SEP-2015 00:29:24<$> (06/04/2008)  Endoscopic total meniscectomy of knee   Medications  aspirin 81 mg oral Delayed Release (EC) tablet, 81 mg= 1 tab(s), Oral, BID  atenolol 25 mg oral tablet, See Instructions, 3 refills  cetirizine 10 mg oral tablet, See Instructions, 3 refills  clopidogrel 75 mg oral tablet, See Instructions, 3 refills  fluocinonide 0.05% topical cream, TOP,  TID  hydrochlorothiazide-lisinopril 25 mg-20 mg oral tablet, See Instructions, 3 refills  pravastatin 20 mg oral tablet, See Instructions, 3 refills  Allergies  No Known Allergies  Social History  Abuse/Neglect  No, No, Yes, 07/08/2021  Alcohol  Current, Beer, 1-2 times per month, 08/27/2019  Employment/School  disability, 03/07/2016  disability, Highest education level: High school., 02/28/2015  Exercise  Exercise frequency: Daily. Self assessment: Fair condition., 02/28/2015  Home/Environment  Lives with Mother. Living situation: Home/Independent. Home equipment: Walker/Cane, B/P CUFF . Alcohol abuse in household: No. Substance abuse in household: No. Smoker in household: Yes. Injuries/Abuse/Neglect in household: No. Feels unsafe at home: No. Family/Friends available for support: Yes. Concern for family members at home: No. Major illness in household: No. Financial concerns: No., 10/19/2015  Nutrition/Health  Regular, Caffeine intake amount: 2 CANS OF DIET COKE. Wants to lose weight: Yes. Sleeping concerns: No. Feels highly stressed: No., 10/19/2015  Sexual  Gender Identity Identifies as male., 06/06/2019  Substance Use  Past, Cocaine, 02/28/2015  Tobacco  10 or more cigarettes (1/2 pack or more)/day in last 30 days, Yes, 07/08/2021  Family History  Alzheimers disease: Father.  Cancer: Other.  Diabetes mellitus type 2: Father.  Heart disease: Brother.  Stroke: Brother.  Immunizations  Vaccine Date Status   COVID-19 MRNA, LNP-S, PF- Pfizer 03/17/2021 Given   COVID-19 MRNA, LNP-S, PF- Pfizer 02/24/2021 Given   Health Maintenance  Health Maintenance  ???Pending?(in the next year)  ??? ??OverDue  ??? ? ? ?Influenza Vaccine due??10/01/20??and every 1??day(s)  ??? ? ? ?Aspirin Therapy for CVD Prevention due??11/13/20??and every 1??year(s)  ??? ? ? ?Smoking Cessation due??01/01/21??and every 1??year(s)  ??? ? ? ?Alcohol Misuse Screening due??01/02/21??and every 1??year(s)  ??? ??Due?  ??? ? ? ?COPD Maintenance-Spirometry  due??07/08/21??Unknown Frequency  ??? ? ? ?Lung Cancer Screening due??07/08/21??and every 1??year(s)  ??? ? ? ?Medicare Annual Wellness Exam due??07/08/21??and every 1??year(s)  ??? ? ? ?Zoster Vaccine due??07/08/21??Unknown Frequency  ??? ??Refused?  ??? ? ? ?Tetanus Vaccine due??07/08/21??and every 10??year(s)  ??? ??Due In Future?  ??? ? ? ?Obesity Screening not due until??01/01/22??and every 1??year(s)  ??? ? ? ?Blood Pressure Screening not due until??01/12/22??and every 1??year(s)  ??? ? ? ?Body Mass Index Check not due until??01/12/22??and every 1??year(s)  ??? ? ? ?Depression Screening not due until??01/12/22??and every 1??year(s)  ??? ? ? ?Hypertension Management-Blood Pressure not due until??01/12/22??and every 1??year(s)  ??? ? ? ?ADL Screening not due until??01/12/22??and every 1??year(s)  ??? ? ? ?Diabetes Screening not due until??07/02/22??and every 1??year(s)  ??? ? ? ?Hypertension Management-BMP not due until??07/02/22??and every 1??year(s)  ???Satisfied?(in the past 1 year)  ??? ??Satisfied?  ??? ? ? ?ADL Screening on??01/12/21.??Satisfied by Ben Torrez LPN  ??? ? ? ?Blood Pressure Screening on??07/08/21.??Satisfied by Melissa Newell LPN  ??? ? ? ?Body Mass Index Check on??07/08/21.??Satisfied by Melissa Newell LPN  ??? ? ? ?Coronary Artery Disease Maintenance-Lipid Lowering Therapy on??01/12/21.??Satisfied by Ariela Lyle MD  ??? ? ? ?Depression Screening on??01/12/21.??Satisfied by Ben Torrez LPN  ??? ? ? ?Diabetes Screening on??07/02/21.??Satisfied by Carmela Basilio  ??? ? ? ?Hypertension Management-Blood Pressure on??07/08/21.??Satisfied by Melissa Newell LPN  ??? ? ? ?Influenza Vaccine on??01/05/21.??Satisfied by Connie Mijares  ??? ? ? ?Lipid Screening on??07/02/21.??Satisfied by Carmela Basilio  ??? ? ? ?Obesity Screening on??07/08/21.??Satisfied by Melissa Newell LPN  ?  Diagnostic Results  AP lateral right knee reviewed. ?Patients knee implant to feel  well fixed and well aligned. ?Does have?a laterally tilted patella although not frankly dislocated.

## 2022-05-02 NOTE — HISTORICAL [facility] CERNER
This is a historical note converted from Katharina. Formatting and pictures may have been removed.  Please reference Katharina for original formatting and attached multimedia. Chief Complaint  2 WEEK POST RIGHT TKA ON 11/11/19. ?HAS BEEN IN REHAB [facility] FOR 14 DAYS. ?HE HAS PAIN TO STAND. ?HAVING HOME ?P.T.  History of Present Illness  59-year-old male presents here for follow-up of total knee arthroplasty. ?Patient is?2 weeks out from his right total knee. ?Overall he is doing fairly well. ?Has some pain at times. ?Has been discharged home from rehab with home therapy?and working with physical therapy at home and making progress.  Review of Systems  Denies fevers, chills, chest pain, shortness of breath. Comprehensive review of systems performed and otherwise negative except as noted in HPI.  Physical Exam  Vitals & Measurements  T:?97.3? ?F (Oral)? BP:?113/76?  HT:?172.72?cm? WT:?112.10?kg? BMI:?37.58?  Examination knee:  Incision clean dry and intact. No erythema or drainage or signs of infection.  Sensation intact distally to left foot  Positive FHL/EHL/gastrocsoleus/tib ant  Brisk capillary refill to left foot  No swelling or signs of DVT.  Range of motion: 5 to 90  Stable to varus valgus  Stable to anterior and posterior drawer  Assessment/Plan  1.?Aftercare following joint replacement?Z47.1  ?Patient progressing fairly well. ?His pain is controlled.? Recommend ongoing physical therapy for strengthening.? We will see him back in a month.? Plan to discontinue sutures today.  Ordered:  Clinic Follow up, *Est. 12/31/19 3:00:00 CST, Order for future visit, Aftercare following joint replacement, [facility]  Post-Op follow-up visit [id number] PC, Aftercare following joint replacement, [facility], 12/03/19 10:04:00 CST  XR Knee Right 4 or More Views, Routine, *Est. 12/31/19 3:00:00 CST, Arthritis, None, Patient Bed, Patient Has IV?, Rad Type, Order for future visit, Aftercare following joint replacement, Not  Scheduled, *Est. 12/31/19 3:00:00 CST  ?  Referrals  Clinic Follow up, *Est. 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  Hyperlipidemia(  Confirmed  )  Hypertension  Mechanical loosening of internal left knee prosthetic joint  Morbid obesity(  Probable Diagnosis  )  Pre-op exam  Primary osteoarthritis of right knee  Stroke  Tobacco user  Tobacco user(  Probable Diagnosis  )  Historical  CVA (cerebral infarction)  Rash(  Confirmed  )  Procedure/Surgical History  SHIV VICTORIA Total Knee Arthroplasty (Right) (11/11/2019)  Replacement of Right Knee Joint with Synthetic Substitute, Uncemented, Open Approach (11/11/2019)  Biopsy Gastrointestional (12/08/2016)  Colonoscopy (12/08/2016)  Colonoscopy, flexible; with removal of tumor(s), polyp(s), or other lesion(s) by hot biopsy forceps (12/08/2016)  Esophagogastroduodenoscopy (12/08/2016)  Esophagogastroduodenoscopy, flexible, transoral; with biopsy, single or multiple (12/08/2016)  Excision of Large Intestine, Via Natural or Artificial Opening Endoscopic, Diagnostic (12/08/2016)  Excision of Stomach, Pylorus, Via Natural or Artificial Opening Endoscopic, Diagnostic (12/08/2016)  Total knee replacement 29-SEP-2015 00:29:24<$> (06/04/2008)  Endoscopic total meniscectomy of knee   Medications  aspirin 81 mg oral Delayed Release (EC) tablet, 81 mg= 1 tab(s), Oral, BID  atenolol 25 mg oral tablet, See Instructions, 3 refills  cetirizine 10 mg oral tablet, See Instructions, 10 refills  clopidogrel 75 mg oral tablet, See Instructions, 3 refills  fluocinonide 0.05% topical cream, TOP, TID  hydrochlorothiazide-lisinopril 25 mg-20 mg oral tablet, See Instructions, 3 refills  MiraLax (polyethylene glycol 3350), 17 gm, Oral, Daily,? ?Not Taking per Prescriber: Last Dose Date/Time Unknown  Neurontin 300 mg oral  capsule, 300 mg= 1 cap(s), Oral, BID,? ?Not Taking per Prescriber: Last Dose Date/Time Unknown  pravastatin 20 mg oral tablet, See Instructions, 3 refills  Robaxin 750 mg oral tablet, 750 mg= 1 tab(s), Oral, TID, PRN,? ?Not Taking per Prescriber: Last Dose Date/Time Unknown  Tylenol, 500 mg= 1 tab(s), Oral, q4hr,? ?Not Taking per Prescriber: Last Dose Date/Time Unknown  Ultram 50 mg oral tablet, 50 mg= 1 tab(s), Oral, q4hr, PRN,? ?Not Taking per Prescriber: Last Dose Date/Time Unknown  Allergies  No Known Allergies  Social History  Abuse/Neglect  No, No, Yes, 10/31/2019  Alcohol  Current, Beer, 1-2 times per month, 08/27/2019  Employment/School  disability, 03/07/2016  disability, Highest education level: High school., 02/28/2015  Exercise  Exercise frequency: Daily. Self assessment: Fair condition., 02/28/2015  Home/Environment  Lives with Mother. Living situation: Home/Independent. Home equipment: Walker/Cane, B/P CUFF . Alcohol abuse in household: No. Substance abuse in household: No. Smoker in household: Yes. Injuries/Abuse/Neglect in household: No. Feels unsafe at home: No. Family/Friends available for support: Yes. Concern for family members at home: No. Major illness in household: No. Financial concerns: No., 10/19/2015  Nutrition/Health  Regular, Caffeine intake amount: 2 CANS OF DIET COKE. Wants to lose weight: Yes. Sleeping concerns: No. Feels highly stressed: No., 10/19/2015  Sexual  Gender Identity Identifies as male., 06/06/2019  Substance Use  Past, Cocaine, 02/28/2015  Tobacco  5-9 cigarettes (between 1/4 to 1/2 pack)/day in last 30 days, N/A, 11/11/2019  Family History  Alzheimers disease: Father.  Cancer: Other.  Diabetes mellitus type 2: Father.  Heart disease: Brother.  Stroke: Brother.  Health Maintenance  Health Maintenance  ???Pending?(in the next year)  ??? ??OverDue  ??? ? ? ?Coronary Artery Disease Maintenance-Antiplatelet Agent Prescribed due??and every?  ??? ? ? ?Coronary Artery Disease  Maintenance-Lipid Lowering Therapy due??and every?  ??? ? ? ?Diabetes Screening due??and every?  ??? ??Due?  ??? ? ? ?HF-LVEF due??12/03/19??and every 2??year(s)  ??? ? ? ?Lung Cancer Screening due??12/03/19??and every 1??year(s)  ??? ? ? ?Zoster Vaccine due??12/03/19??and every 100??year(s)  ??? ??Refused?  ??? ? ? ?Tetanus Vaccine due??12/03/19??and every 10??year(s)  ??? ??Due In Future?  ??? ? ? ?Alcohol Misuse Screening not due until??01/01/20??and every 1??year(s)  ??? ? ? ?Obesity Screening not due until??01/01/20??and every 1??year(s)  ??? ? ? ?Smoking Cessation not due until??01/01/20??and every 1??year(s)  ??? ? ? ?Blood Pressure Screening not due until??10/30/20??and every 1??year(s)  ??? ? ? ?Depression Screening not due until??10/30/20??and every 1??year(s)  ??? ? ? ?Hypertension Management-Blood Pressure not due until??10/30/20??and every 1??year(s)  ??? ? ? ?ADL Screening not due until??10/31/20??and every 1??year(s)  ??? ? ? ?Body Mass Index Check not due until??11/10/20??and every 1??year(s)  ??? ? ? ?HF-Heart Failure Education not due until??11/12/20??and every 1??year(s)  ??? ? ? ?Hypertension Management-BMP not due until??11/12/20??and every 1??year(s)  ??? ? ? ?Aspirin Therapy for CVD Prevention not due until??11/13/20??and every 1??year(s)  ???Satisfied?(in the past 1 year)  ??? ??Satisfied?  ??? ? ? ?ADL Screening on??10/31/19.??Satisfied by Alicia Lepe LPN  ??? ? ? ?Alcohol Misuse Screening on??09/24/19.??Satisfied by Connie Mijares  ??? ? ? ?Aspirin Therapy for CVD Prevention on??11/13/19.??Satisfied by Izabela Box NP  ??? ? ? ?Blood Pressure Screening on??12/03/19.??Satisfied by Connie Mijares  ??? ? ? ?Body Mass Index Check on??12/03/19.??Satisfied by Connie Mijares  ??? ? ? ?COPD Maintenance-Spirometry on??11/13/19.??Satisfied by Trish Vera RN  ??? ? ? ?Coronary Artery Disease Maintenance-Antiplatelet Agent Prescribed on??11/13/19.??Satisfied by Charu DORSEY  Izabela SIMPSON  ??? ? ? ?Depression Screening on??10/31/19.??Satisfied by Alicia Lepe LPN  ??? ? ? ?Diabetes Screening on??11/13/19.??Satisfied by Kerrie Sargent  ??? ? ? ?Hypertension Management-Blood Pressure on??12/03/19.??Satisfied by Connie Mijares  ??? ? ? ?Influenza Vaccine on??10/17/19.??Satisfied by Nabeel CATHERINE, Cristine SIMPSON.  ??? ? ? ?Lipid Screening on??09/18/19.??Satisfied by Adamaris Nuno  ??? ? ? ?Obesity Screening on??12/03/19.??Satisfied by Connie Mijares  ??? ? ? ?Smoking Cessation on??08/06/19.??Satisfied by David Calvillo  ??? ??Refused?  ??? ? ? ?Influenza Vaccine on??09/27/19.??Recorded by Alicia Lepe LPN??Reason: Patient Refuses  ??? ? ? ?Tetanus Vaccine on??10/31/19.??Recorded by Alicia Lepe LPN  ?  Diagnostic Results  AP lateral right knee reviewed. ?Patients implants appear well fixed. ?No signs of loosening or subsidence noted.

## 2022-05-02 NOTE — HISTORICAL [facility]
This is a historical note converted from Katharina. Formatting and pictures may have been removed.  Please reference Katharina for original formatting and attached multimedia. Chief Complaint  3 MONTH F/U RIGHT TKA ON 11/11/19.  History of Present Illness  59-year-old male presents here with follow-up of total knee on the right side. ?Patient is 6 months out but overall doing very well. ?He has no complaints of pain in the right side.? Left knee continues to be somewhat stiff?with difficulty with movement as well as extension.  Review of Systems  Denies fevers, chills, chest pain, shortness of breath. Comprehensive review of systems performed and otherwise negative except as noted in HPI.  Physical Exam  Vitals & Measurements  T:?97.8? ?F (Oral)? BP:?106/74?  HT:?174?cm? WT:?111?kg? BMI:?36.66?  General: No acute distress, alert and oriented, healthy appearing?  HEENT: Head is atraumatic, mucous membranes are moist  Cardiovascular: Brisk capillary refill  Lungs: Breathing non-labored  Skin: no rashes appreciated  Neurologic: Sensation is grossly intact distally  ?  Examination knee:  Incision clean dry and intact. No erythema or drainage or signs of infection.  Sensation intact distally to left foot  Positive FHL/EHL/gastrocsoleus/tib ant  Brisk capillary refill to left foot  No swelling or signs of DVT.  Range of motion: 5 to 120  Stable to varus valgus  Stable to anterior and posterior drawer  ?   Left knee:  Patient with ongoing?stiffness to the left knee. ?He has a continued extensor lag.  Assessment/Plan  1.?Aftercare following joint replacement?Z47.1  ?Patient overall doing fairly well following knee arthroplasty on the right side. ?I prescribed. ?Plan to see him back in 6 months for this. ?He is interested?in going revision of his left knee for extensor mechanism disruption as well as laxity.? At this point given his significant stiffness, fusion is likely his best bet.? We will recheck this in 6 months as  well.  Ordered:  Clinic Follow up, *Est. 11/05/20 3:00:00 CST, Order for future visit, Aftercare following joint replacement, [facility]  Office/Outpatient Visit Level 3 Established [id number] PC, Aftercare following joint replacement, [facility], 05/05/20 10:50:00 CDT  XR Knee Left 1 or 2 Views, Routine, *Est. 11/05/20 3:00:00 CST, Fall, None, Patient Bed, Patient Has IV?, Rad Type, Order for future visit, Aftercare following joint replacement, Not Scheduled, *Est. 11/05/20 3:00:00 CST  XR Knee Right 1 or 2 Views, Routine, *Est. 11/05/20 3:00:00 CST, Fall, None, Patient Bed, Patient Has IV?, Rad Type, Order for future visit, Aftercare following joint replacement, Not Scheduled, *Est. 11/05/20 3:00:00 CST  XR Knee Right 3 Views, Routine, 05/05/20 10:28:00 CDT, Pain, None, Patient Bed, Patient Has IV?, Rad Type, Aftercare following joint replacement, Not Scheduled, 05/05/20 10:28:00 CDT  ?  Referrals  Clinic Follow up, *Est. 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  Hyperlipidemia(  Confirmed  )  Hypertension  Mechanical loosening of internal left knee prosthetic joint  Morbid obesity(  Probable Diagnosis  )  Pre-op exam  Primary osteoarthritis of right knee  Stroke  Tobacco user  Tobacco user(  Probable Diagnosis  )  Historical  CVA (cerebral infarction)  Rash(  Confirmed  )  Procedure/Surgical History  SHIV VICTORIA Total Knee Arthroplasty (Right) (11/11/2019)  Replacement of Right Knee Joint with Synthetic Substitute, Uncemented, Open Approach (11/11/2019)  Biopsy Gastrointestional (12/08/2016)  Colonoscopy (12/08/2016)  Colonoscopy, flexible; with removal of tumor(s), polyp(s), or other lesion(s) by hot biopsy forceps (12/08/2016)  Esophagogastroduodenoscopy (12/08/2016)  Esophagogastroduodenoscopy, flexible, transoral; with  biopsy, single or multiple (12/08/2016)  Excision of Large Intestine, Via Natural or Artificial Opening Endoscopic, Diagnostic (12/08/2016)  Excision of Stomach, Pylorus, Via Natural or Artificial Opening Endoscopic, Diagnostic (12/08/2016)  Total knee replacement 29-SEP-2015 00:29:24<$> (06/04/2008)  Endoscopic total meniscectomy of knee   Medications  aspirin 81 mg oral Delayed Release (EC) tablet, 81 mg= 1 tab(s), Oral, BID  atenolol 25 mg oral tablet, See Instructions, 3 refills  cetirizine 10 mg oral tablet, See Instructions  clopidogrel 75 mg oral tablet, See Instructions, 3 refills  fluocinonide 0.05% topical cream, TOP, TID  hydrochlorothiazide-lisinopril 25 mg-20 mg oral tablet, See Instructions, 3 refills  hydrOXYzine pamoate 25 mg oral capsule, 25 mg= 1 cap(s), Oral, qPM, 1 refills  pravastatin 20 mg oral tablet, See Instructions, 3 refills  Allergies  No Known Allergies  Social History  Abuse/Neglect  No, No, Yes, 05/05/2020  Alcohol  Current, Beer, 1-2 times per month, 08/27/2019  Employment/School  disability, 03/07/2016  disability, Highest education level: High school., 02/28/2015  Exercise  Exercise frequency: Daily. Self assessment: Fair condition., 02/28/2015  Home/Environment  Lives with Mother. Living situation: Home/Independent. Home equipment: Walker/Cane, B/P CUFF . Alcohol abuse in household: No. Substance abuse in household: No. Smoker in household: Yes. Injuries/Abuse/Neglect in household: No. Feels unsafe at home: No. Family/Friends available for support: Yes. Concern for family members at home: No. Major illness in household: No. Financial concerns: No., 10/19/2015  Nutrition/Health  Regular, Caffeine intake amount: 2 CANS OF DIET COKE. Wants to lose weight: Yes. Sleeping concerns: No. Feels highly stressed: No., 10/19/2015  Sexual  Gender Identity Identifies as male., 06/06/2019  Substance Use  Past, Cocaine, 02/28/2015  Tobacco  5-9 cigarettes (between 1/4 to 1/2 pack)/day in last 30  days, Yes, 05/05/2020  Family History  Alzheimers disease: Father.  Cancer: Other.  Diabetes mellitus type 2: Father.  Heart disease: Brother.  Stroke: Brother.  Health Maintenance  Health Maintenance  ???Pending?(in the next year)  ??? ??OverDue  ??? ? ? ?Coronary Artery Disease Maintenance-Antiplatelet Agent Prescribed due??and every?  ??? ? ? ?Coronary Artery Disease Maintenance-Lipid Lowering Therapy due??and every?  ??? ? ? ?Diabetes Screening due??and every?  ??? ??Due?  ??? ? ? ?HF-LVEF due??05/05/20??and every 2??year(s)  ??? ? ? ?Lung Cancer Screening due??05/05/20??and every 1??year(s)  ??? ? ? ?Medicare Annual Wellness Exam due??05/05/20??and every 1??year(s)  ??? ? ? ?Zoster Vaccine due??05/05/20??and every 100??year(s)  ??? ??Refused?  ??? ? ? ?Tetanus Vaccine due??05/05/20??and every 10??year(s)  ??? ??Due In Future?  ??? ? ? ?Depression Screening not due until??10/30/20??and every 1??year(s)  ??? ? ? ?Hypertension Management-BMP not due until??11/12/20??and every 1??year(s)  ??? ? ? ?Aspirin Therapy for CVD Prevention not due until??11/13/20??and every 1??year(s)  ??? ? ? ?HF-Heart Failure Education not due until??12/08/20??and every 1??year(s)  ??? ? ? ?ADL Screening not due until??12/09/20??and every 1??year(s)  ??? ? ? ?Alcohol Misuse Screening not due until??01/01/21??and every 1??year(s)  ??? ? ? ?Obesity Screening not due until??01/01/21??and every 1??year(s)  ??? ? ? ?Smoking Cessation not due until??01/01/21??and every 1??year(s)  ???Satisfied?(in the past 1 year)  ??? ??Satisfied?  ??? ? ? ?ADL Screening on??12/09/19.??Satisfied by Ben Torrez LPN  ??? ? ? ?Alcohol Misuse Screening on??05/05/20.??Satisfied by Connie Mijares  ??? ? ? ?Aspirin Therapy for CVD Prevention on??11/13/19.??Satisfied by Izabela Box NP  ??? ? ? ?Blood Pressure Screening on??05/05/20.??Satisfied by Connie Mijares  ??? ? ? ?Body Mass Index Check on??05/05/20.??Satisfied by Connie Mijares  ??? ?  ? ?COPD Maintenance-Spirometry on??11/13/19.??Satisfied by Trish Vera RN  ??? ? ? ?Coronary Artery Disease Maintenance-Antiplatelet Agent Prescribed on??04/03/20.??Satisfied by Ariela Lyle MD  ??? ? ? ?Depression Screening on??10/31/19.??Satisfied by Alicia Lepe LPN  ??? ? ? ?Diabetes Screening on??11/13/19.??Satisfied by Kerrie Sargent  ??? ? ? ?Hypertension Management-Blood Pressure on??05/05/20.??Satisfied by Connie Mijares  ??? ? ? ?Influenza Vaccine on??12/09/19.??Satisfied by Ben Torrez LPN  ??? ? ? ?Lipid Screening on??09/18/19.??Satisfied by Adamaris Nuno  ??? ? ? ?Obesity Screening on??05/05/20.??Satisfied by Connie Mijares  ??? ? ? ?Smoking Cessation on??01/07/20.??Satisfied by Connie Mijares  ??? ??Refused?  ??? ? ? ?Influenza Vaccine on??09/27/19.??Recorded by Alicia Lepe LPN??Reason: Patient Refuses  ??? ? ? ?Tetanus Vaccine on??10/31/19.??Recorded by Alicia Lepe LPN  ?  Diagnostic Results  AP lateral?right knee reviewed. ?Patients implants well fixed with no signs of loosening or subsidence noted.

## 2022-05-02 NOTE — HISTORICAL [facility] CERNER
This is a historical note converted from Katharina. Formatting and pictures may have been removed.  Please reference Katharina for original formatting and attached multimedia. Chief Complaint  1 ?MONTH F/U RIGHT TKAO N 1/11/19. ?STILL IN PHYSICAL THERAPY.  History of Present Illness  59-year-old male presents here for follow-up after?total knee on the right side. ?Patient overall doing fairly well. ?His pain is much improved in his preoperative levels. ?He is having issues with his left knee?where he is developing a contracture.  Review of Systems  Denies fevers, chills, chest pain, shortness of breath. Comprehensive review of systems performed and otherwise negative except as noted in HPI.  Physical Exam  Vitals & Measurements  T:?98.6? ?F (Oral)? BP:?118/76?  HT:?174?cm? WT:?111?kg? BMI:?36.66?  General: No acute distress, alert and oriented, healthy appearing?  HEENT: Head is atraumatic, mucous membranes are moist  Cardiovascular: Brisk capillary refill  Lungs: Breathing non-labored  Skin: no rashes appreciated  Neurologic: Sensation is grossly intact distally  ?  Right knee:  ?  Examination knee:  Incision clean dry and intact. No erythema or drainage or signs of infection.  Sensation intact distally to left foot  Positive FHL/EHL/gastrocsoleus/tib ant  Brisk capillary refill to left foot  No swelling or signs of DVT.  Range of motion: 5 to 110  Stable to varus valgus  Stable to anterior and posterior drawer  Assessment/Plan  1.?Aftercare following joint replacement?Z47.1  ?Patient overall is doing fairly well following his knee arthroplasty.? His main issue is actually his extensor mechanism disruption?on his contralateral side.? Let them continue to recover from his right knee and see him back in 3 months to discuss?options on his left side.? At this point,?given his contracture, I think his best option may be effusion.  Ordered:  Clinic Follow up, *Est. 04/07/20 3:00:00 CDT, Order for future visit, Aftercare  following joint replacement, Orthopaedics  Office/Outpatient Visit Level 3 Established [id number] PC, Aftercare following joint replacement, Orthopaedics Clinic, 01/07/20 15:35:00 CST  XR Knee Left 1 or 2 Views, Routine, *Est. 04/07/20 3:00:00 CDT, Inflammation, None, Patient Bed, Patient Has IV?, Rad Type, Order for future visit, Aftercare following joint replacement, Not Scheduled, *Est. 04/07/20 3:00:00 CDT  ?  Referrals  Clinic Follow up, *Est. 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  Hyperlipidemia(  Confirmed  )  Hypertension  Mechanical loosening of internal left knee prosthetic joint  Morbid obesity(  Probable Diagnosis  )  Pre-op exam  Primary osteoarthritis of right knee  Stroke  Tobacco user  Tobacco user(  Probable Diagnosis  )  Historical  CVA (cerebral infarction)  Rash(  Confirmed  )  Procedure/Surgical History  SHIVHUA VICTORIA Total Knee Arthroplasty (Right) (11/11/2019)  Replacement of Right Knee Joint with Synthetic Substitute, Uncemented, Open Approach (11/11/2019)  Biopsy Gastrointestional (12/08/2016)  Colonoscopy (12/08/2016)  Colonoscopy, flexible; with removal of tumor(s), polyp(s), or other lesion(s) by hot biopsy forceps (12/08/2016)  Esophagogastroduodenoscopy (12/08/2016)  Esophagogastroduodenoscopy, flexible, transoral; with biopsy, single or multiple (12/08/2016)  Excision of Large Intestine, Via Natural or Artificial Opening Endoscopic, Diagnostic (12/08/2016)  Excision of Stomach, Pylorus, Via Natural or Artificial Opening Endoscopic, Diagnostic (12/08/2016)  Total knee replacement 29-SEP-2015 00:29:24<$> (06/04/2008)  Endoscopic total meniscectomy of knee   Medications  aspirin 81 mg oral Delayed Release (EC) tablet, 81 mg= 1 tab(s), Oral, BID  atenolol 25 mg oral tablet, See Instructions, 3  refills  cetirizine 10 mg oral tablet, See Instructions, 10 refills  clopidogrel 75 mg oral tablet, See Instructions, 3 refills  fluocinonide 0.05% topical cream, TOP, TID  hydrochlorothiazide-lisinopril 25 mg-20 mg oral tablet, See Instructions, 3 refills  pravastatin 20 mg oral tablet, See Instructions, 3 refills  Allergies  No Known Allergies  Social History  Abuse/Neglect  No, No, 12/09/2019  Alcohol  Current, Beer, 1-2 times per month, 08/27/2019  Employment/School  disability, 03/07/2016  disability, Highest education level: High school., 02/28/2015  Exercise  Exercise frequency: Daily. Self assessment: Fair condition., 02/28/2015  Home/Environment  Lives with Mother. Living situation: Home/Independent. Home equipment: Walker/Cane, B/P CUFF . Alcohol abuse in household: No. Substance abuse in household: No. Smoker in household: Yes. Injuries/Abuse/Neglect in household: No. Feels unsafe at home: No. Family/Friends available for support: Yes. Concern for family members at home: No. Major illness in household: No. Financial concerns: No., 10/19/2015  Nutrition/Health  Regular, Caffeine intake amount: 2 CANS OF DIET COKE. Wants to lose weight: Yes. Sleeping concerns: No. Feels highly stressed: No., 10/19/2015  Sexual  Gender Identity Identifies as male., 06/06/2019  Substance Use  Past, Cocaine, 02/28/2015  Tobacco  5-9 cigarettes (between 1/4 to 1/2 pack)/day in last 30 days, Yes, 12/09/2019  Family History  Alzheimers disease: Father.  Cancer: Other.  Diabetes mellitus type 2: Father.  Heart disease: Brother.  Stroke: Brother.  Health Maintenance  Health Maintenance  ???Pending?(in the next year)  ??? ??OverDue  ??? ? ? ?Coronary Artery Disease Maintenance-Antiplatelet Agent Prescribed due??and every?  ??? ? ? ?Coronary Artery Disease Maintenance-Lipid Lowering Therapy due??and every?  ??? ? ? ?Diabetes Screening due??and every?  ??? ??Due?  ??? ? ? ?Alcohol Misuse Screening due??01/01/20??and every  1??year(s)  ??? ? ? ?HF-LVEF due??01/07/20??and every 2??year(s)  ??? ? ? ?Lung Cancer Screening due??01/07/20??and every 1??year(s)  ??? ? ? ?Zoster Vaccine due??01/07/20??and every 100??year(s)  ??? ??Refused?  ??? ? ? ?Tetanus Vaccine due??01/07/20??and every 10??year(s)  ??? ??Due In Future?  ??? ? ? ?Depression Screening not due until??10/30/20??and every 1??year(s)  ??? ? ? ?Hypertension Management-BMP not due until??11/12/20??and every 1??year(s)  ??? ? ? ?Aspirin Therapy for CVD Prevention not due until??11/13/20??and every 1??year(s)  ??? ? ? ?HF-Heart Failure Education not due until??12/08/20??and every 1??year(s)  ??? ? ? ?ADL Screening not due until??12/09/20??and every 1??year(s)  ??? ? ? ?Obesity Screening not due until??01/01/21??and every 1??year(s)  ??? ? ? ?Smoking Cessation not due until??01/01/21??and every 1??year(s)  ??? ? ? ?Blood Pressure Screening not due until??01/06/21??and every 1??year(s)  ??? ? ? ?Body Mass Index Check not due until??01/06/21??and every 1??year(s)  ??? ? ? ?Hypertension Management-Blood Pressure not due until??01/06/21??and every 1??year(s)  ???Satisfied?(in the past 1 year)  ??? ??Satisfied?  ??? ? ? ?ADL Screening on??12/09/19.??Satisfied by Ben Torrez LPN  ??? ? ? ?Alcohol Misuse Screening on??09/24/19.??Satisfied by Connie Mijares  ??? ? ? ?Aspirin Therapy for CVD Prevention on??11/13/19.??Satisfied by Izabela Box NP  ??? ? ? ?Blood Pressure Screening on??01/07/20.??Satisfied by Connie Mijares  ??? ? ? ?Body Mass Index Check on??01/07/20.??Satisfied by Connie Mijares  ??? ? ? ?COPD Maintenance-Spirometry on??11/13/19.??Satisfied by Trish Vera RN  ??? ? ? ?Coronary Artery Disease Maintenance-Antiplatelet Agent Prescribed on??11/13/19.??Satisfied by Izabela Box NP  ??? ? ? ?Depression Screening on??10/31/19.??Satisfied by Alicia Lepe LPN  ??? ? ? ?Diabetes Screening on??11/13/19.??Satisfied by Kerrie Sargent  ??? ? ?  ?Hypertension Management-Blood Pressure on??01/07/20.??Satisfied by Connie Mijares  ??? ? ? ?Influenza Vaccine on??12/09/19.??Satisfied by Ben Torrez LPN  ??? ? ? ?Lipid Screening on??09/18/19.??Satisfied by Adamaris Nuno  ??? ? ? ?Obesity Screening on??01/07/20.??Satisfied by Connie Mijares  ??? ? ? ?Smoking Cessation on??01/07/20.??Satisfied by Connie Mijares  ??? ??Refused?  ??? ? ? ?Influenza Vaccine on??09/27/19.??Recorded by Alicia Lepe LPN??Reason: Patient Refuses  ??? ? ? ?Tetanus Vaccine on??10/31/19.??Recorded by Alicia Lepe LPN  ?  Diagnostic Results  AP lateral right knee reviewed. ?Patients implants appear well fixed. ?No signs of loosening or subsidence noted.

## 2022-05-02 NOTE — HISTORICAL [facility] CERNER
This is a historical note converted from Katharina. Formatting and pictures may have been removed.  Please reference Katharina for original formatting and attached multimedia. Chief Complaint  6 MONTH F/U RIGHT TKA ON 11/11/19. Pt c/o issues with his knee cap in right knee. Pt stated his left knee is giving him issues as well.  History of Present Illness  60-year-old male presents today roughly a year out from his total knee on the right side. ?Patient states about 2 weeks ago he woke up?and he noted his patella on the right side was sitting laterally. ?He was able to reduce this and since that time has had no pain or no issues. 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  Review of Systems  Denies fevers, chills, chest pain, shortness of breath. Comprehensive review of systems performed and otherwise negative except as noted in HPI.  Physical Exam  Vitals & Measurements  T:?36.5? ?C (Oral)? HR:?81(Peripheral)? BP:?126/78?  HT:?174.00?cm? WT:?111.000?kg? BMI:?36.66?  General: No acute distress, alert and oriented, healthy appearing?  HEENT: Head is atraumatic, mucous membranes are moist?  Cardiovascular: Brisk capillary refill  Lungs: Breathing non-labored?  Skin: no rashes appreciated?  Neurologic: Sensation is grossly intact distally  ?  Right knee:  Sensation intact distally. ?Brisk cap refill is severe patient is full extension actively and passively.? Patient with?full extension. ?Flexion to 110. ?Sensation intact distally. ?Patella tracks well with no instability no swelling noted.  ?  Left knee shows persistent?extensor mechanism disruption.  Assessment/Plan  1.?Aftercare following joint replacement?Z47.1  ?Patient has mostly arthroplasty. ?His patella appears to track well. ?His x-rays and shows implants are all stable in the right knee. ?Left knee she continues to show extensor mechanism rupture.? We will plan to place the patient in a lateral padded knee sleeve  on the right side to allow us?patella?and his?MPFL?to?scar back down.? No signs of any instability noted currently.   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  Hyperlipidemia(  Confirmed  )  Hypertension  Mechanical loosening of internal left knee prosthetic joint  Morbid obesity(  Probable Diagnosis  )  Pre-op exam  Primary osteoarthritis of right knee  Stroke  Tobacco user  Tobacco user(  Probable Diagnosis  )  Historical  CVA (cerebral infarction)  Rash(  Confirmed  )  Procedure/Surgical History  SHIV VICTORIA Total Knee Arthroplasty (Right) (11/11/2019)  Replacement of Right Knee Joint with Synthetic Substitute, Uncemented, Open Approach (11/11/2019)  Biopsy Gastrointestional (12/08/2016)  Colonoscopy (12/08/2016)  Colonoscopy, flexible; with removal of tumor(s), polyp(s), or other lesion(s) by hot biopsy forceps (12/08/2016)  Esophagogastroduodenoscopy (12/08/2016)  Esophagogastroduodenoscopy, flexible, transoral; with biopsy, single or multiple (12/08/2016)  Excision of Large Intestine, Via Natural or Artificial Opening Endoscopic, Diagnostic (12/08/2016)  Excision of Stomach, Pylorus, Via Natural or Artificial Opening Endoscopic, Diagnostic (12/08/2016)  Total knee replacement 29-SEP-2015 00:29:24<$> (06/04/2008)  Endoscopic total meniscectomy of knee   Medications  aspirin 81 mg oral Delayed Release (EC) tablet, 81 mg= 1 tab(s), Oral, BID  atenolol 25 mg oral tablet, See Instructions, 3 refills  cetirizine 10 mg oral tablet, See Instructions  clopidogrel 75 mg oral tablet, See Instructions, 3 refills  fluocinonide 0.05% topical cream, TOP, TID  hydrochlorothiazide-lisinopril 25 mg-20 mg oral tablet, See Instructions, 3 refills  pravastatin 20 mg oral tablet, See Instructions, 3 refills  Allergies  No Known Allergies  Social History  Abuse/Neglect  No, No, Yes, 11/03/2020  Alcohol  Current, Beer, 1-2 times per month,  08/27/2019  Employment/School  disability, 03/07/2016  disability, Highest education level: High school., 02/28/2015  Exercise  Exercise frequency: Daily. Self assessment: Fair condition., 02/28/2015  Home/Environment  Lives with Mother. Living situation: Home/Independent. Home equipment: Walker/Cane, B/P CUFF . Alcohol abuse in household: No. Substance abuse in household: No. Smoker in household: Yes. Injuries/Abuse/Neglect in household: No. Feels unsafe at home: No. Family/Friends available for support: Yes. Concern for family members at home: No. Major illness in household: No. Financial concerns: No., 10/19/2015  Nutrition/Health  Regular, Caffeine intake amount: 2 CANS OF DIET COKE. Wants to lose weight: Yes. Sleeping concerns: No. Feels highly stressed: No., 10/19/2015  Sexual  Gender Identity Identifies as male., 06/06/2019  Substance Use  Past, Cocaine, 02/28/2015  Tobacco  10 or more cigarettes (1/2 pack or more)/day in last 30 days, Yes, 11/03/2020  Family History  Alzheimers disease: Father.  Cancer: Other.  Diabetes mellitus type 2: Father.  Heart disease: Brother.  Stroke: Brother.  Health Maintenance  Health Maintenance  ???Pending?(in the next year)  ??? ??Due?  ??? ? ? ?Influenza Vaccine due??10/01/20??and every 1??day(s)  ??? ? ? ?COPD Maintenance-Spirometry due??11/03/20??Unknown Frequency  ??? ? ? ?Lung Cancer Screening due??11/03/20??and every 1??year(s)  ??? ? ? ?Medicare Annual Wellness Exam due??11/03/20??and every 1??year(s)  ??? ? ? ?Zoster Vaccine due??11/03/20??Unknown Frequency  ??? ??Refused?  ??? ? ? ?Tetanus Vaccine due??11/03/20??and every 10??year(s)  ??? ??Due In Future?  ??? ? ? ?Aspirin Therapy for CVD Prevention not due until??11/13/20??and every 1??year(s)  ??? ? ? ?Obesity Screening not due until??01/01/21??and every 1??year(s)  ??? ? ? ?Smoking Cessation not due until??01/01/21??and every 1??year(s)  ??? ? ? ?Alcohol Misuse Screening not due until??01/02/21??and every  1??year(s)  ??? ? ? ?Hypertension Management-BMP not due until??05/29/21??and every 1??year(s)  ??? ? ? ?ADL Screening not due until??06/04/21??and every 1??year(s)  ???Satisfied?(in the past 1 year)  ??? ??Satisfied?  ??? ? ? ?ADL Screening on??06/04/20.??Satisfied by Ben Torrez LPN  ??? ? ? ?Alcohol Misuse Screening on??05/05/20.??Satisfied by Connie Mijares  ??? ? ? ?Aspirin Therapy for CVD Prevention on??11/13/19.??Satisfied by Izabela Box NP  ??? ? ? ?Blood Pressure Screening on??11/03/20.??Satisfied by Melissa Rodriguez LPN  ??? ? ? ?Body Mass Index Check on??11/03/20.??Satisfied by Melissa Rodriguez LPN  ??? ? ? ?COPD Maintenance-Spirometry on??11/13/19.??Satisfied by Trish Vera RN  ??? ? ? ?Coronary Artery Disease Maintenance-Antiplatelet Agent Prescribed on??04/03/20.??Satisfied by Ariela Lyle MD  ??? ? ? ?Depression Screening on??11/03/20.??Satisfied by Melissa Rodriguez LPN  ??? ? ? ?Diabetes Screening on??05/29/20.??Satisfied by Robert Isaac Jr.  ??? ? ? ?Hypertension Management-Blood Pressure on??11/03/20.??Satisfied by Melissa Rodriguez LPN  ??? ? ? ?Influenza Vaccine on??12/09/19.??Satisfied by Ben Torrez LPN  ??? ? ? ?Lipid Screening on??05/29/20.??Satisfied by Robert Isaac Jr.  ??? ? ? ?Obesity Screening on??11/03/20.??Satisfied by Melissa Rodriguez LPN  ??? ? ? ?Smoking Cessation on??01/07/20.??Satisfied by Connie Mijares  ?  Diagnostic Results  AP lateral right knee reviewed.? AP lateral left knee reviewed. ?Patients implants all appear well fixed to the right as well as left knee. ?Previous extensor mechanism rupture noted?to the left knee.

## 2022-05-26 ENCOUNTER — LAB VISIT (OUTPATIENT)
Dept: LAB | Facility: HOSPITAL | Age: 62
End: 2022-05-26
Attending: STUDENT IN AN ORGANIZED HEALTH CARE EDUCATION/TRAINING PROGRAM
Payer: MEDICARE

## 2022-05-26 DIAGNOSIS — Z96.659 HISTORY OF TOTAL KNEE REPLACEMENT, UNSPECIFIED LATERALITY: ICD-10-CM

## 2022-05-26 DIAGNOSIS — E78.5 HYPERLIPIDEMIA, UNSPECIFIED HYPERLIPIDEMIA TYPE: ICD-10-CM

## 2022-05-26 DIAGNOSIS — I10 HYPERTENSION, UNSPECIFIED TYPE: Primary | ICD-10-CM

## 2022-05-26 DIAGNOSIS — I69.30 HISTORY OF CVA WITH RESIDUAL DEFICIT: ICD-10-CM

## 2022-05-26 LAB
ALBUMIN SERPL-MCNC: 3.7 GM/DL (ref 3.4–4.8)
ALBUMIN/GLOB SERPL: 1 RATIO (ref 1.1–2)
ALP SERPL-CCNC: 71 UNIT/L (ref 40–150)
ALT SERPL-CCNC: 12 UNIT/L (ref 0–55)
AST SERPL-CCNC: 14 UNIT/L (ref 5–34)
BASOPHILS # BLD AUTO: 0.05 X10(3)/MCL (ref 0–0.2)
BASOPHILS NFR BLD AUTO: 0.7 %
BILIRUBIN DIRECT+TOT PNL SERPL-MCNC: 0.3 MG/DL
BUN SERPL-MCNC: 14.6 MG/DL (ref 8.4–25.7)
CALCIUM SERPL-MCNC: 9.5 MG/DL (ref 8.8–10)
CHLORIDE SERPL-SCNC: 102 MMOL/L (ref 98–107)
CHOLEST SERPL-MCNC: 136 MG/DL
CHOLEST/HDLC SERPL: 3 {RATIO} (ref 0–5)
CO2 SERPL-SCNC: 27 MMOL/L (ref 23–31)
CREAT SERPL-MCNC: 1.13 MG/DL (ref 0.73–1.18)
DEPRECATED CALCIDIOL+CALCIFEROL SERPL-MC: 18.7 NG/ML (ref 30–80)
EOSINOPHIL # BLD AUTO: 0.46 X10(3)/MCL (ref 0–0.9)
EOSINOPHIL NFR BLD AUTO: 6.1 %
ERYTHROCYTE [DISTWIDTH] IN BLOOD BY AUTOMATED COUNT: 17.3 % (ref 11.5–17)
EST. AVERAGE GLUCOSE BLD GHB EST-MCNC: 125.5 MG/DL
GLOBULIN SER-MCNC: 3.7 GM/DL (ref 2.4–3.5)
GLUCOSE SERPL-MCNC: 98 MG/DL (ref 82–115)
HBA1C MFR BLD: 6 %
HCT VFR BLD AUTO: 41.8 % (ref 42–52)
HDLC SERPL-MCNC: 42 MG/DL (ref 35–60)
HGB BLD-MCNC: 13.3 GM/DL (ref 14–18)
IMM GRANULOCYTES # BLD AUTO: 0.03 X10(3)/MCL (ref 0–0.02)
IMM GRANULOCYTES NFR BLD AUTO: 0.4 % (ref 0–0.43)
LDLC SERPL CALC-MCNC: 80 MG/DL (ref 50–140)
LYMPHOCYTES # BLD AUTO: 1.72 X10(3)/MCL (ref 0.6–4.6)
LYMPHOCYTES NFR BLD AUTO: 22.8 %
MCH RBC QN AUTO: 24.3 PG (ref 27–31)
MCHC RBC AUTO-ENTMCNC: 31.8 MG/DL (ref 33–36)
MCV RBC AUTO: 76.3 FL (ref 80–94)
MONOCYTES # BLD AUTO: 0.7 X10(3)/MCL (ref 0.1–1.3)
MONOCYTES NFR BLD AUTO: 9.3 %
NEUTROPHILS # BLD AUTO: 4.6 X10(3)/MCL (ref 2.1–9.2)
NEUTROPHILS NFR BLD AUTO: 60.7 %
NRBC BLD AUTO-RTO: 0 %
PLATELET # BLD AUTO: 318 X10(3)/MCL (ref 130–400)
PMV BLD AUTO: 11.2 FL (ref 9.4–12.4)
POTASSIUM SERPL-SCNC: 3.8 MMOL/L (ref 3.5–5.1)
PROT SERPL-MCNC: 7.4 GM/DL (ref 5.8–7.6)
RBC # BLD AUTO: 5.48 X10(6)/MCL (ref 4.7–6.1)
SODIUM SERPL-SCNC: 139 MMOL/L (ref 136–145)
T4 SERPL-MCNC: 6.32 UG/DL (ref 4.87–11.72)
TRIGL SERPL-MCNC: 69 MG/DL (ref 34–140)
TSH SERPL-ACNC: 2.24 UIU/ML (ref 0.35–4.94)
VLDLC SERPL CALC-MCNC: 14 MG/DL
WBC # SPEC AUTO: 7.6 X10(3)/MCL (ref 4.5–11.5)

## 2022-05-26 PROCEDURE — 80061 LIPID PANEL: CPT

## 2022-05-26 PROCEDURE — 83036 HEMOGLOBIN GLYCOSYLATED A1C: CPT | Mod: GA

## 2022-05-26 PROCEDURE — 84443 ASSAY THYROID STIM HORMONE: CPT

## 2022-05-26 PROCEDURE — 85025 COMPLETE CBC W/AUTO DIFF WBC: CPT

## 2022-05-26 PROCEDURE — 80053 COMPREHEN METABOLIC PANEL: CPT

## 2022-05-26 PROCEDURE — 82306 VITAMIN D 25 HYDROXY: CPT | Mod: GA

## 2022-05-26 PROCEDURE — 84436 ASSAY OF TOTAL THYROXINE: CPT

## 2022-05-26 PROCEDURE — 84156 ASSAY OF PROTEIN URINE: CPT

## 2022-05-26 PROCEDURE — 36415 COLL VENOUS BLD VENIPUNCTURE: CPT

## 2022-05-27 RX ORDER — HYDROXYZINE PAMOATE 25 MG/1
25 CAPSULE ORAL 3 TIMES DAILY PRN
COMMUNITY
End: 2023-05-29

## 2022-05-27 RX ORDER — METHOCARBAMOL 750 MG/1
750 TABLET, FILM COATED ORAL
COMMUNITY
Start: 2022-02-09 | End: 2023-05-29

## 2022-05-27 RX ORDER — TRAMADOL HYDROCHLORIDE 50 MG/1
50 TABLET ORAL EVERY 6 HOURS PRN
COMMUNITY
Start: 2022-03-17 | End: 2023-05-29

## 2022-05-27 RX ORDER — ATENOLOL 25 MG/1
25 TABLET ORAL DAILY
COMMUNITY
Start: 2022-04-25 | End: 2022-08-25 | Stop reason: SDUPTHER

## 2022-05-27 RX ORDER — CETIRIZINE HYDROCHLORIDE 10 MG/1
10 TABLET ORAL DAILY PRN
COMMUNITY
Start: 2022-04-25 | End: 2022-08-25 | Stop reason: SDUPTHER

## 2022-05-27 RX ORDER — PRAVASTATIN SODIUM 20 MG/1
20 TABLET ORAL DAILY
COMMUNITY
Start: 2022-04-25 | End: 2022-08-25 | Stop reason: SDUPTHER

## 2022-05-27 RX ORDER — GABAPENTIN 300 MG/1
300 CAPSULE ORAL 2 TIMES DAILY
COMMUNITY
Start: 2022-02-09 | End: 2023-05-29

## 2022-05-27 RX ORDER — LISINOPRIL AND HYDROCHLOROTHIAZIDE 20; 25 MG/1; MG/1
1 TABLET ORAL DAILY
COMMUNITY
Start: 2022-04-25 | End: 2022-08-25 | Stop reason: SDUPTHER

## 2022-05-27 RX ORDER — CARVEDILOL 25 MG/1
25 TABLET ORAL
COMMUNITY
End: 2022-08-25

## 2022-05-27 RX ORDER — HYDROXYZINE HYDROCHLORIDE 25 MG/1
25 TABLET, FILM COATED ORAL
COMMUNITY
End: 2023-05-29

## 2022-05-27 RX ORDER — CLOPIDOGREL BISULFATE 75 MG/1
75 TABLET ORAL DAILY
COMMUNITY
Start: 2022-04-25 | End: 2022-08-25 | Stop reason: SDUPTHER

## 2022-05-27 RX ORDER — IPRATROPIUM BROMIDE AND ALBUTEROL 20; 100 UG/1; UG/1
SPRAY, METERED RESPIRATORY (INHALATION)
COMMUNITY
End: 2023-01-09

## 2022-05-30 ENCOUNTER — OFFICE VISIT (OUTPATIENT)
Dept: INTERNAL MEDICINE | Facility: CLINIC | Age: 62
End: 2022-05-30

## 2022-05-30 VITALS
DIASTOLIC BLOOD PRESSURE: 83 MMHG | RESPIRATION RATE: 20 BRPM | TEMPERATURE: 98 F | WEIGHT: 240 LBS | HEIGHT: 68 IN | HEART RATE: 80 BPM | SYSTOLIC BLOOD PRESSURE: 127 MMHG | OXYGEN SATURATION: 98 % | BODY MASS INDEX: 36.37 KG/M2

## 2022-05-30 DIAGNOSIS — E78.5 HYPERLIPIDEMIA, UNSPECIFIED HYPERLIPIDEMIA TYPE: ICD-10-CM

## 2022-05-30 DIAGNOSIS — I10 HYPERTENSION, UNSPECIFIED TYPE: ICD-10-CM

## 2022-05-30 DIAGNOSIS — I69.359 CVA, OLD, HEMIPARESIS: Primary | ICD-10-CM

## 2022-05-30 DIAGNOSIS — E55.9 VITAMIN D DEFICIENCY: ICD-10-CM

## 2022-05-30 DIAGNOSIS — Z72.0 TOBACCO USE: ICD-10-CM

## 2022-05-30 DIAGNOSIS — R73.03 PRE-DIABETES: ICD-10-CM

## 2022-05-30 DIAGNOSIS — M19.90 ARTHRITIS: ICD-10-CM

## 2022-05-30 PROCEDURE — 90471 IMMUNIZATION ADMIN: CPT | Mod: PBBFAC

## 2022-05-30 PROCEDURE — 90715 TDAP VACCINE 7 YRS/> IM: CPT | Mod: PBBFAC

## 2022-05-30 PROCEDURE — 99215 OFFICE O/P EST HI 40 MIN: CPT | Mod: PBBFAC

## 2022-05-30 RX ORDER — ACETAMINOPHEN 500 MG
2000 TABLET ORAL DAILY
Qty: 90 CAPSULE | Refills: 0 | Status: SHIPPED | OUTPATIENT
Start: 2022-05-30 | End: 2022-08-25 | Stop reason: ALTCHOICE

## 2022-05-30 NOTE — PROGRESS NOTES
[facility] INTERNAL MEDICINE  OUTPATIENT OFFICE VISIT NOTE    SUBJECTIVE:      HPI: ISABELLE Frank is a 61 y.o. yo male w/ PMH of  PMH of CVA (w/ residual LUE weakness), HTN, HLD, Obesity, Tobacco Use, and Arthritis (B/L knee arthroplasty) who presents to [facility] for follow up visit. Last visit was via telemedicine for transition of care from Dr. Lyle (1/2022) during last COVID surge. Patient is followed by ortho for arthritis and recently had left knee fusion completed (2/2022) s/t fixed flexion contracture. Today, Mr. Frank reports feeling well overall and current has no acute complaints. He endorses occasional mild pain located at lateral left hip region that occurs with ambulation; though does not have to take oral analgesics for relief. Chronically, he suffers from chronic bronchitis/sinusitis over the past ~10 years with associated chronic mildly productive cough of clear sputum. He denies prior PFTS and has no diagnosis of COPD or asthma. He denies any current use of albuterol inhalers or shortness of breath on exertion/rest. Was referred for Low Dose CT and GI lab for colonoscopy at previous appointment however failed to F/U s/t upcomming knee surgery, is willing to complete both at this time. He reports compliance with all prescribed medications without adverse side effects. Patient has been off of aspirin for a few years s/t fall risk with continuation of Plavix. Risk and benefits were discussed in re-starting aspirin and at this time he would like to continue with Plavix only. He currently denies any associated chest pain, shortness of breath, fever, chills, nausea, vomiting, unexpected weight loss, hemoptysis/hematemesis/hematochezia/melena, orthopnea/PND/LE swelling. Of note, since last visit he has reduced tobacco consumption from 1/2 ppd to x3 cigarettes/day.          OBJECTIVE:     Vital signs:   /83 (BP Location: Right arm, Patient Position: Sitting, BP Method: Large  "(Automatic))   Pulse 80   Temp 97.7 °F (36.5 °C) (Oral)   Resp 20   Ht 5' 8" (1.727 m)   Wt 108.9 kg (240 lb)   SpO2 98%   BMI 36.49 kg/m²      Physical Examination:  General: Well nourished w/o distress  HEENT: NC/AT; PERRLA; nasal and oral mucosa moist and clear; no sinus tenderness; no thyromegaly  Neck: Full ROM; no lymphadenopathy  Pulm: CTA bilaterally, normal work of breathing  CV: S1, S2 w/o murmurs or gallops; no edema noted  GI: Soft with normal bowel sounds in all quadrants, no masses on palpation  MSK: LLE knee fusion in extended position with inability to flex. B/L midline anterior knee scars present & well healed  Derm: Multiple chronic lesions (b/l UE/LE) which are chronic/non-tender/non-pustular  Neuro: AAOx4; CN II-XII intact; 4/5 LUE motor, preserved motor throughout remaining motor/sensory function intact  Psych: Cooperative; appropriate mood and affect         ASSESSMENT & PLAN:   HTN - well controlled  - BP at goal (127/83)  - Continue Atenolol 25 mg daily, HCTZ-Lisinopril 25-20mg daily  - Recommended DASH diet, pt is agreeable with plan    Osteoarthritis   - B/L Knee Arthroplasty w/ multiple revisions   - Pt followed by ortho; s/p Left Knee Fusion (2/2022), recovering well   - Mild pain noted at left hip; not requiring oral analgesics   - Is able to ambulate with walker, mostly uses wheelchair at home   - Continue to f/u with Ortho, appreciate recommendations     Hx of CVA   - Residual deficit of (left UE weakness)   - Continue Aspirin 81 daily, Plavix 75 daily   - Has no taken Aspirin 81 approximately x3 years; discussed risks/benefits of restarting Aspirin        -- At this time; patient would like to continue anti-platelet therapy with Plavix 75 only s/t elevated fall risk s/p recent knee procedure        -- Plan to re-visit patient's decision at future office visit     HLD  - fasting lipid profile at goal  - Continue Pravastatin 20 mg daily     Tobacco User  Chronic Bronchitis   - " Reduced from 1/2 ppd to x3 cigarettes daily since (1/2022)   - 30 pack year smoker   - Referred for Low-Dose CT Thorax & Baseline PFTs; patient is agreeable   - Not interested in smoking cessation assistance at this time    Immunization History   Administered Date(s) Administered    COVID-19, MRNA, LN-S, PF (Pfizer) (Purple Cap) 02/24/2021, 03/17/2021, 10/20/2021    Pneumococcal Conjugate - 13 Valent 05/30/2022    Tdap 05/30/2022         Health Maintenance:  Shingrix                                            (has not completed)       -- plan to address at next office appt  COVID-19-Pfizer (x2 + booster)      2/21 ; 3/21 ; 10/21  Colonoscopy                                     12/2016                         -- Reffered to GI Procedure Lab (1/11)  Low-Dose CT Scan                            ----------                        -- Ordered for completion (5/2022)    Return to clinic in 3 months with labs     Stuart Singer MD  [facility], PGY-1

## 2022-06-21 ENCOUNTER — OFFICE VISIT (OUTPATIENT)
Dept: ORTHOPEDICS | Facility: CLINIC | Age: 62
End: 2022-06-21
Payer: MEDICARE

## 2022-06-21 ENCOUNTER — HOSPITAL ENCOUNTER (OUTPATIENT)
Dept: RADIOLOGY | Facility: CLINIC | Age: 62
Discharge: HOME | End: 2022-06-21
Attending: ORTHOPAEDIC SURGERY
Payer: MEDICARE

## 2022-06-21 VITALS
WEIGHT: 240 LBS | DIASTOLIC BLOOD PRESSURE: 85 MMHG | SYSTOLIC BLOOD PRESSURE: 107 MMHG | BODY MASS INDEX: 36.37 KG/M2 | TEMPERATURE: 98 F | HEART RATE: 84 BPM | HEIGHT: 68 IN

## 2022-06-21 DIAGNOSIS — Z96.652 AFTERCARE FOLLOWING LEFT KNEE JOINT REPLACEMENT SURGERY: ICD-10-CM

## 2022-06-21 DIAGNOSIS — S72.142D CLOSED 2-PART INTERTROCHANTERIC FRACTURE OF PROXIMAL FEMUR, LEFT, WITH ROUTINE HEALING, SUBSEQUENT ENCOUNTER: ICD-10-CM

## 2022-06-21 DIAGNOSIS — M16.12 PRIMARY OSTEOARTHRITIS OF LEFT HIP: Primary | ICD-10-CM

## 2022-06-21 DIAGNOSIS — M25.561 ACUTE PAIN OF BOTH KNEES: ICD-10-CM

## 2022-06-21 DIAGNOSIS — Z47.1 AFTERCARE FOLLOWING LEFT KNEE JOINT REPLACEMENT SURGERY: ICD-10-CM

## 2022-06-21 DIAGNOSIS — M25.562 ACUTE PAIN OF BOTH KNEES: ICD-10-CM

## 2022-06-21 DIAGNOSIS — M16.12 PRIMARY OSTEOARTHRITIS OF LEFT HIP: ICD-10-CM

## 2022-06-21 PROCEDURE — 73502 XR HIP WITH PELVIS WHEN PERFORMED, 2 OR 3 VIEWS LEFT: ICD-10-PCS | Mod: LT,,, | Performed by: ORTHOPAEDIC SURGERY

## 2022-06-21 PROCEDURE — 73564 XR KNEE COMP 4 OR MORE VIEWS BILAT: ICD-10-PCS | Mod: 50,,, | Performed by: ORTHOPAEDIC SURGERY

## 2022-06-21 PROCEDURE — 99213 OFFICE O/P EST LOW 20 MIN: CPT | Mod: ,,, | Performed by: ORTHOPAEDIC SURGERY

## 2022-06-21 PROCEDURE — 73502 X-RAY EXAM HIP UNI 2-3 VIEWS: CPT | Mod: LT,,, | Performed by: ORTHOPAEDIC SURGERY

## 2022-06-21 PROCEDURE — 73564 X-RAY EXAM KNEE 4 OR MORE: CPT | Mod: 50,,, | Performed by: ORTHOPAEDIC SURGERY

## 2022-06-21 PROCEDURE — 99213 PR OFFICE/OUTPT VISIT, EST, LEVL III, 20-29 MIN: ICD-10-PCS | Mod: ,,, | Performed by: ORTHOPAEDIC SURGERY

## 2022-06-21 RX ORDER — ASPIRIN 81 MG/1
81 TABLET ORAL DAILY
COMMUNITY
End: 2022-08-25 | Stop reason: SDUPTHER

## 2022-06-21 NOTE — PROGRESS NOTES
Past Medical History:   Diagnosis Date    Essential (primary) hypertension     High cholesterol     Hyperlipidemia     Morbid obesity     Stroke     Stroke     Unspecified osteoarthritis, unspecified site        Past Surgical History:   Procedure Laterality Date    left total knee      right total knee      TOTAL KNEE ARTHROPLASTY Bilateral        Current Outpatient Medications   Medication Sig    aspirin (ECOTRIN) 81 MG EC tablet Take 81 mg by mouth once daily.    atenoloL (TENORMIN) 25 MG tablet Take 25 mg by mouth once daily.    carvediloL (COREG) 25 MG tablet Take 25 mg by mouth.    cetirizine (ZYRTEC) 10 MG tablet Take 10 mg by mouth daily as needed.    cholecalciferol, vitamin D3, (VITAMIN D3) 50 mcg (2,000 unit) Cap capsule Take 1 capsule (2,000 Units total) by mouth once daily.    clopidogreL (PLAVIX) 75 mg tablet Take 75 mg by mouth once daily.    gabapentin (NEURONTIN) 300 MG capsule Take 300 mg by mouth 2 (two) times daily.    hydrOXYzine HCL (ATARAX) 25 MG tablet Take 25 mg by mouth.    hydrOXYzine pamoate (VISTARIL) 25 MG Cap Take 25 mg by mouth 3 (three) times daily as needed.    ipratropium-albuteroL (COMBIVENT RESPIMAT)  mcg/actuation inhaler Inhale into the lungs.    lisinopriL-hydrochlorothiazide (PRINZIDE,ZESTORETIC) 20-25 mg Tab Take 1 tablet by mouth once daily.    methocarbamoL (ROBAXIN) 750 MG Tab Take 750 mg by mouth.    oxycodone HCl/acetaminophen (PERCOCET ORAL) Take by mouth.    pravastatin (PRAVACHOL) 20 MG tablet Take 20 mg by mouth once daily.    traMADoL (ULTRAM) 50 mg tablet Take 50 mg by mouth every 6 (six) hours as needed.     No current facility-administered medications for this visit.       Review of patient's allergies indicates:  No Known Allergies    Family History   Problem Relation Age of Onset    COPD Mother     Hypertension Father     COPD Father     Stroke Brother        Social History     Socioeconomic History    Marital status: Single    Tobacco Use    Smoking status: Current Every Day Smoker     Packs/day: 0.25     Types: Cigarettes    Smokeless tobacco: Never Used    Tobacco comment: patient refused counseling   Substance and Sexual Activity    Alcohol use: Not Currently    Drug use: Not Currently    Sexual activity: Not Currently     Social Determinants of Health     Financial Resource Strain: Low Risk     Difficulty of Paying Living Expenses: Not hard at all   Food Insecurity: No Food Insecurity    Worried About Running Out of Food in the Last Year: Never true    Ran Out of Food in the Last Year: Never true   Transportation Needs: No Transportation Needs    Lack of Transportation (Medical): No    Lack of Transportation (Non-Medical): No   Physical Activity: Inactive    Days of Exercise per Week: 0 days    Minutes of Exercise per Session: 0 min   Stress: No Stress Concern Present    Feeling of Stress : Not at all   Social Connections: Socially Isolated    Frequency of Communication with Friends and Family: More than three times a week    Frequency of Social Gatherings with Friends and Family: More than three times a week    Attends Restorationism Services: Never    Active Member of Clubs or Organizations: No    Attends Club or Organization Meetings: Never    Marital Status: Never    Housing Stability: Low Risk     Unable to Pay for Housing in the Last Year: No    Number of Places Lived in the Last Year: 1    Unstable Housing in the Last Year: No       Chief Complaint:   Chief Complaint   Patient presents with    Follow-up     left TKA 2/7/22-5/8/22, States having pain in shin area. Denies any pain in knee at this time, ambulating with walker today.        History of present illness:  61-year-old male presents today for follow-up of left knee effusion and right total knee arthroplasty.  Overall the patient's right knee is doing well.  He is having no issues with his patella despite history of patella instability.  Right  knee has no pain he is ambulating now with a walker.  Has no complaints of pain to the left knee or left hip.      Review of Systems:    Constitution: Negative for chills, fever, and sweats.  Negative for unexplained weight loss.    HENT:  Negative for headaches and blurry vision.    Cardiovascular:Negative for chest pain or irregular heart beat. Negative for hypertension.    Respiratory:  Negative for cough and shortness of breath.    Gastrointestinal: Negative for abdominal pain, heartburn, melena, nausea, and vomitting.    Genitourinary:  Negative bladder incontinence and dysuria.    Musculoskeletal:  See HPI    Neurological: Negative for numbness.    Psychiatric/Behavioral: Negative for depression.  The patient is not nervous/anxious.      Endocrine: Negative for polyuria    Hematologic/Lymphatic: Negative for bleeding problem.  Does not bruise/bleed easily.    Skin: Negative for poor would healing and rash      Physical Examination:    Vital Signs:    Vitals:    06/21/22 0809   BP: 107/85   Pulse: 84   Temp: 98.2 °F (36.8 °C)       Body mass index is 36.49 kg/m².    General: No acute distress, alert and oriented, healthy appearing    HEENT: Head is atraumatic, mucous membranes are moist    Neck: Supples, no JVD    Cardiovascular: Palpable dorsalis pedis and posterior tibial pulses, regular rate and rhythm to those pulses    Lungs: Breathing non-labored    Skin: no rashes appreciated    Neurologic: Can flex and extend knees, ankles, and toes. Sensation is grossly intact    Right knee:  Incision well-healed.  Patient with full extension.  Flexion greater than 120°.  Stable throughout range of motion.    Left knee:  No range of motion given underlying effusion.  Range of motion of his left hip without significant pain.  Neurovascularly intact left foot.    X-rays:  Three views of the left hip taken and reviewed.  Patient with reverse oblique intertrochanteric fracture that is periprosthetic around his louise.  This  is actually gone on to union.  Four views of bilateral knees taken and reviewed.  Patient's right knee is well fixed there is no signs of loosening or subsidence.  Patella does sit somewhat centrally although his in the trochlear groove.  Left knee effusion appears stable in nature and appears to be bridging callus.     Assessment::  Status post right total knee arthroplasty.  Status post left knee fusion.  Non operative left intertrochanteric femur fracture    Plan:  Patient overall doing well.  Weight-bearing tolerance bilateral lower extremities.  Plan repeat x-ray of his left hip in 3 months.    This note was created using [employer] voice recognition software that occasionally misinterpreted phrases or words.    Consult note is delivered via Epic messaging service.

## 2022-08-23 ENCOUNTER — HOSPITAL ENCOUNTER (OUTPATIENT)
Dept: PULMONOLOGY | Facility: HOSPITAL | Age: 62
Discharge: HOME | End: 2022-08-23
Attending: STUDENT IN AN ORGANIZED HEALTH CARE EDUCATION/TRAINING PROGRAM
Payer: MEDICARE

## 2022-08-23 DIAGNOSIS — Z72.0 TOBACCO USE: ICD-10-CM

## 2022-08-23 LAB
DLCO SINGLE BREATH LLN: 20.79
DLCO SINGLE BREATH PRE REF: 80.2 %
DLCO SINGLE BREATH REF: 27.72
DLCOC SBVA LLN: 2.8
DLCOC SBVA REF: 4
DLCOC SINGLE BREATH LLN: 20.79
DLCOC SINGLE BREATH REF: 27.72
DLCOVA LLN: 2.8
DLCOVA PRE REF: 109.1 %
DLCOVA PRE: 4.37 ML/(MIN*MMHG*L) (ref 2.8–5.21)
DLCOVA REF: 4
ERV LLN: -16448.87
ERV PRE REF: 51.4 %
ERV REF: 1.13
FEF 25 75 CHG: 7.9 %
FEF 25 75 LLN: 0.93
FEF 25 75 POST REF: 61.7 %
FEF 25 75 PRE REF: 57.2 %
FEF 25 75 REF: 2.35
FET100 CHG: 5.4 %
FEV1 CHG: 15.4 %
FEV1 FVC CHG: -0.9 %
FEV1 FVC LLN: 66
FEV1 FVC POST REF: 88.9 %
FEV1 FVC PRE REF: 89.7 %
FEV1 FVC REF: 78
FEV1 LLN: 2.02
FEV1 POST REF: 80.3 %
FEV1 PRE REF: 69.6 %
FEV1 REF: 2.84
FRCPLETH LLN: 2.59
FRCPLETH PREREF: 103.9 %
FRCPLETH REF: 3.58
FVC CHG: 16.5 %
FVC LLN: 2.69
FVC POST REF: 90.2 %
FVC PRE REF: 77.5 %
FVC REF: 3.66
IVC PRE: 3.09 L (ref 2.69–4.65)
IVC SINGLE BREATH LLN: 2.69
IVC SINGLE BREATH PRE REF: 84.4 %
IVC SINGLE BREATH REF: 3.66
MVV LLN: 109
MVV PRE REF: 57.1 %
MVV REF: 129
PEF CHG: 28.8 %
PEF LLN: 5.47
PEF POST REF: 56 %
PEF PRE REF: 43.5 %
PEF REF: 8.05
POST FEF 25 75: 1.45 L/S (ref 0.93–4.41)
POST FET 100: 7.06 SEC
POST FEV1 FVC: 69.08 % (ref 65.52–88.36)
POST FEV1: 2.28 L (ref 2.02–3.61)
POST FVC: 3.3 L (ref 2.69–4.65)
POST PEF: 4.5 L/S (ref 5.47–10.63)
PRE DLCO: 22.23 ML/(MIN*MMHG) (ref 20.79–34.65)
PRE ERV: 0.58 L (ref -16448.87–16451.13)
PRE FEF 25 75: 1.34 L/S (ref 0.93–4.41)
PRE FET 100: 6.7 SEC
PRE FEV1 FVC: 69.69 % (ref 65.52–88.36)
PRE FEV1: 1.98 L (ref 2.02–3.61)
PRE FRC PL: 3.72 L (ref 2.59–4.57)
PRE FVC: 2.84 L (ref 2.69–4.65)
PRE MVV: 73.51 L/MIN (ref 109.45–148.08)
PRE PEF: 3.5 L/S (ref 5.47–10.63)
PRE RV: 3.14 L (ref 1.78–3.13)
PRE TLC: 6.07 L (ref 5.77–8.07)
RAW LLN: 3.06
RAW PRE REF: 138.6 %
RAW PRE: 4.24 CMH2O*S/L (ref 3.06–3.06)
RAW REF: 3.06
RV LLN: 1.78
RV PRE REF: 128 %
RV REF: 2.45
RVTLC LLN: 30
RVTLC PRE REF: 134.2 %
RVTLC PRE: 51.7 % (ref 29.55–47.51)
RVTLC REF: 39
TLC LLN: 5.77
TLC PRE REF: 87.7 %
TLC REF: 6.92
VA PRE: 5.09 L (ref 6.77–6.77)
VA SINGLE BREATH LLN: 6.77
VA SINGLE BREATH PRE REF: 75.2 %
VA SINGLE BREATH REF: 6.77
VC LLN: 2.69
VC PRE REF: 80.1 %
VC PRE: 2.93 L (ref 2.69–4.65)
VC REF: 3.66

## 2022-08-23 PROCEDURE — 94726 PLETHYSMOGRAPHY LUNG VOLUMES: CPT

## 2022-08-23 PROCEDURE — 94729 DIFFUSING CAPACITY: CPT

## 2022-08-23 PROCEDURE — 94640 AIRWAY INHALATION TREATMENT: CPT

## 2022-08-23 PROCEDURE — 94060 EVALUATION OF WHEEZING: CPT

## 2022-08-23 RX ORDER — IPRATROPIUM BROMIDE 0.5 MG/2.5ML
0.5 SOLUTION RESPIRATORY (INHALATION) ONCE
Status: COMPLETED | OUTPATIENT
Start: 2022-08-23 | End: 2022-08-23

## 2022-08-23 RX ORDER — ALBUTEROL SULFATE 0.83 MG/ML
2.5 SOLUTION RESPIRATORY (INHALATION) EVERY 4 HOURS PRN
Status: DISCONTINUED | OUTPATIENT
Start: 2022-08-23 | End: 2022-08-25

## 2022-08-23 RX ADMIN — IPRATROPIUM BROMIDE 0.5 MG: 0.5 SOLUTION RESPIRATORY (INHALATION) at 08:08

## 2022-08-23 RX ADMIN — ALBUTEROL SULFATE 2.5 MG: 0.83 SOLUTION RESPIRATORY (INHALATION) at 08:08

## 2022-08-25 ENCOUNTER — OFFICE VISIT (OUTPATIENT)
Dept: INTERNAL MEDICINE | Facility: CLINIC | Age: 62
End: 2022-08-25
Payer: MEDICARE

## 2022-08-25 VITALS
BODY MASS INDEX: 36.04 KG/M2 | RESPIRATION RATE: 20 BRPM | HEART RATE: 67 BPM | DIASTOLIC BLOOD PRESSURE: 72 MMHG | OXYGEN SATURATION: 97 % | WEIGHT: 237.81 LBS | HEIGHT: 68 IN | SYSTOLIC BLOOD PRESSURE: 108 MMHG | TEMPERATURE: 98 F

## 2022-08-25 DIAGNOSIS — E55.9 VITAMIN D DEFICIENCY: ICD-10-CM

## 2022-08-25 DIAGNOSIS — Z23 NEED FOR SHINGLES VACCINE: Primary | ICD-10-CM

## 2022-08-25 DIAGNOSIS — E78.5 HYPERLIPIDEMIA, UNSPECIFIED HYPERLIPIDEMIA TYPE: ICD-10-CM

## 2022-08-25 DIAGNOSIS — M15.9 OSTEOARTHRITIS OF MULTIPLE JOINTS, UNSPECIFIED OSTEOARTHRITIS TYPE: ICD-10-CM

## 2022-08-25 DIAGNOSIS — I63.9 CEREBROVASCULAR ACCIDENT (CVA), UNSPECIFIED MECHANISM: ICD-10-CM

## 2022-08-25 DIAGNOSIS — Z12.12 SCREENING FOR MALIGNANT NEOPLASM OF THE RECTUM: ICD-10-CM

## 2022-08-25 DIAGNOSIS — Z72.0 TOBACCO USE: ICD-10-CM

## 2022-08-25 DIAGNOSIS — I10 HYPERTENSION, UNSPECIFIED TYPE: ICD-10-CM

## 2022-08-25 PROCEDURE — 99214 OFFICE O/P EST MOD 30 MIN: CPT | Mod: PBBFAC

## 2022-08-25 RX ORDER — CLOPIDOGREL BISULFATE 75 MG/1
75 TABLET ORAL DAILY
Qty: 90 TABLET | Refills: 3 | Status: SHIPPED | OUTPATIENT
Start: 2022-08-25 | End: 2022-08-25 | Stop reason: ALTCHOICE

## 2022-08-25 RX ORDER — PRAVASTATIN SODIUM 20 MG/1
20 TABLET ORAL DAILY
Qty: 90 TABLET | Refills: 3 | Status: SHIPPED | OUTPATIENT
Start: 2022-08-25 | End: 2023-01-09 | Stop reason: SDUPTHER

## 2022-08-25 RX ORDER — LISINOPRIL AND HYDROCHLOROTHIAZIDE 20; 25 MG/1; MG/1
1 TABLET ORAL DAILY
Qty: 90 TABLET | Refills: 3 | Status: SHIPPED | OUTPATIENT
Start: 2022-08-25 | End: 2023-01-09 | Stop reason: SDUPTHER

## 2022-08-25 RX ORDER — ALBUTEROL SULFATE 90 UG/1
2 AEROSOL, METERED RESPIRATORY (INHALATION) EVERY 6 HOURS PRN
Qty: 6.7 G | Refills: 0 | Status: SHIPPED | OUTPATIENT
Start: 2022-08-25 | End: 2023-01-09 | Stop reason: SDUPTHER

## 2022-08-25 RX ORDER — CETIRIZINE HYDROCHLORIDE 10 MG/1
10 TABLET ORAL DAILY PRN
Qty: 90 TABLET | Refills: 3 | Status: SHIPPED | OUTPATIENT
Start: 2022-08-25 | End: 2023-10-17 | Stop reason: SDUPTHER

## 2022-08-25 RX ORDER — ATENOLOL 25 MG/1
25 TABLET ORAL DAILY
Qty: 90 TABLET | Refills: 3 | Status: SHIPPED | OUTPATIENT
Start: 2022-08-25 | End: 2023-01-09 | Stop reason: SDUPTHER

## 2022-08-25 RX ORDER — ASPIRIN 81 MG/1
81 TABLET ORAL DAILY
Qty: 90 TABLET | Refills: 3 | Status: SHIPPED | OUTPATIENT
Start: 2022-08-25 | End: 2023-01-09 | Stop reason: SDUPTHER

## 2022-08-25 NOTE — PROGRESS NOTES
"[facility] INTERNAL MEDICINE  OUTPATIENT OFFICE VISIT NOTE    SUBJECTIVE:      HPI: ISABELLE Frank is a 61 y.o. yo male w/ PMH of PMH of CVA (w/ residual LUE weakness), HTN, HLD, Obesity, Tobacco Use, and Arthritis (B/L knee arthroplasty) who presents to [facility] for follow up visit. Last visit (5/2022) in which he complained of chronic bronchitis/sinusitis w/ productive cough of clear sputum (x10 years) w/ no prior PFT on file. Since last appointment, patient has completed PFTs & Low Dose CT Thorax. Preliminary PFT w/ mild obstruction pattern; pending pulmonologist interpretation. CT Thorax benign other than emphysematous changes. Discussed initiating albuterol rescue inhaler as needed; patient is agreeable as he is currently saturating 97% on room air and dose not currently experience any SOB at rest or NICHOLAS. Is still having intermittent cough of white productive phlegm but attributes to concurrent post-nasal drip. Is still smoking cigarettes (x4-5/day; but is trying to quit).  Patient is followed by ortho for arthritis and recently had left knee fusion completed (2/2022) s/t fixed flexion contracture. Is complaining of numbness/tingling to 1st 3 digits of right hand; likely s/t carpal tunnel syndrome. Advised patient to discuss this with Orthopedist & in the mean time obtain and wear a wrist brace at night. Was also discussed ASA/Plavix though given his stability risk; patient decided to take only Aspirin and discontinue Plavix.    He currently denies any associated chest pain, shortness of breath, fever, chills, nausea, vomiting, unexpected weight loss, hemoptysis/hematemesis/hematochezia/melena, orthopnea/PND/LE swelling.     ROS:  10 point ROS performed and negative other than stated above.      OBJECTIVE:     Vital signs:   /72 (BP Location: Left arm, Patient Position: Sitting, BP Method: Large (Automatic))   Pulse 67   Temp 97.7 °F (36.5 °C) (Oral)   Resp 20   Ht 5' 8" (1.727 m)   Wt " 107.9 kg (237 lb 12.8 oz)   SpO2 97%   BMI 36.16 kg/m²      Physical Examination:  General: Well nourished w/o distress  HEENT: NC/AT; PERRLA; nasal and oral mucosa moist and clear; no sinus tenderness; no thyromegaly  Neck: Full ROM; no lymphadenopathy  Pulm: CTA bilaterally, normal work of breathing  CV: S1, S2 w/o murmurs or gallops; no edema noted  GI: Soft with normal bowel sounds in all quadrants, no masses on palpation  MSK: LLE knee fusion in extended position with inability to flex. B/L midline anterior knee scars present & well healed. Positive Tinel & Phalen Test (right wrist)  Derm: Multiple chronic lesions (b/l UE/LE) which are chronic/non-tender/non-pustular  Neuro: AAOx4; CN II-XII intact; 4/5 LUE motor, preserved motor throughout remaining motor/sensory function intact  Psych: Cooperative; appropriate mood and affect    ASSESSMENT & PLAN:   HTN - well controlled  - BP at goal (108/72)  - Continue Atenolol 25 mg daily, HCTZ-Lisinopril 25-20mg daily  - Recommended continuing DASH diet, pt is agreeable with plan     Osteoarthritis  Suspected Right Carpal Tunnel Syndrome   - B/L Knee Arthroplasty w/ multiple revisions   - Pt followed by ortho; s/p Left Knee Fusion (2/2022), recovering well   - Is able to ambulate with walker, mostly uses wheelchair at home   - Recommended nightly right wrist brace until further evaluation of carpal tunnel by orthpedics   - Continue to f/u with Ortho, appreciate recommendations     Hx of CVA   - Residual deficit of (left UE weakness)   - Discontinuing Plavix 75 as therapy duration complete   - Initiated Aspirin 81 daily        -- At prior appointment; patient decided to anti-platelet therapy with Plavix 75 only s/t elevated fall risk s/p recent knee procedure instead of Aspirin        -- Agreeable with above plan    Tobacco User  Chronic Bronchitis  Suspected COPD   - Reduced from 1/2 ppd to x4-5 cigarettes daily since   - 30 pack year smoker, not interested in smoking  cessation assistance at this time   - PFTs (8/2022) suspicious for mild obstructive disease   -- patient not experiencing SOB at rest or NICHOLAS   - Annual Low-Dose CT Thorax (benign w/ emphysematous changes)   - Discussed prescribing rescue inhaler at this time & utilization PRN; patient is agreeable   -- If symptoms worsen or patient uses albuterol inhaler at increasing rates at next office visit; plan to prescribe maintenance MDI therapy    HLD  - fasting lipid profile at goal  - Continue Pravastatin 20 mg daily         Health Maintenance:  Immunization History   Administered Date(s) Administered    COVID-19, MRNA, LN-S, PF (Pfizer) (Purple Cap) 02/24/2021, 03/17/2021, 10/20/2021    Pneumococcal Conjugate - 13 Valent 05/30/2022    Tdap 05/30/2022    Zoster Recombinant 08/25/2022   Colonoscopy  12/2016  -- Cologuard today (8/25/22)  Low-Dose CT Scan - Up to Date (8/2022)    Return to clinic in 4 months    Stuart Singer MD  \[facility]\"" Internal Medicine, PGY-2

## 2022-09-11 LAB — NONINV COLON CA DNA+OCC BLD SCRN STL QL: NEGATIVE

## 2022-09-20 ENCOUNTER — OFFICE VISIT (OUTPATIENT)
Dept: ORTHOPEDICS | Facility: CLINIC | Age: 62
End: 2022-09-20
Payer: MEDICARE

## 2022-09-20 ENCOUNTER — HOSPITAL ENCOUNTER (OUTPATIENT)
Dept: RADIOLOGY | Facility: CLINIC | Age: 62
Discharge: HOME | End: 2022-09-20
Attending: ORTHOPAEDIC SURGERY
Payer: MEDICARE

## 2022-09-20 VITALS
SYSTOLIC BLOOD PRESSURE: 135 MMHG | BODY MASS INDEX: 35.92 KG/M2 | HEART RATE: 74 BPM | WEIGHT: 237 LBS | DIASTOLIC BLOOD PRESSURE: 89 MMHG | HEIGHT: 68 IN

## 2022-09-20 DIAGNOSIS — Z96.652 AFTERCARE FOLLOWING LEFT KNEE JOINT REPLACEMENT SURGERY: Primary | ICD-10-CM

## 2022-09-20 DIAGNOSIS — Z47.1 AFTERCARE FOLLOWING LEFT KNEE JOINT REPLACEMENT SURGERY: Primary | ICD-10-CM

## 2022-09-20 DIAGNOSIS — S72.142D CLOSED 2-PART INTERTROCHANTERIC FRACTURE OF PROXIMAL FEMUR, LEFT, WITH ROUTINE HEALING, SUBSEQUENT ENCOUNTER: ICD-10-CM

## 2022-09-20 PROCEDURE — 73502 X-RAY EXAM HIP UNI 2-3 VIEWS: CPT | Mod: LT,,, | Performed by: ORTHOPAEDIC SURGERY

## 2022-09-20 PROCEDURE — 99213 PR OFFICE/OUTPT VISIT, EST, LEVL III, 20-29 MIN: ICD-10-PCS | Mod: ,,, | Performed by: ORTHOPAEDIC SURGERY

## 2022-09-20 PROCEDURE — 99213 OFFICE O/P EST LOW 20 MIN: CPT | Mod: ,,, | Performed by: ORTHOPAEDIC SURGERY

## 2022-09-20 PROCEDURE — 73502 XR HIP WITH PELVIS WHEN PERFORMED, 2 OR 3 VIEWS LEFT: ICD-10-PCS | Mod: LT,,, | Performed by: ORTHOPAEDIC SURGERY

## 2022-09-20 RX ORDER — CLOPIDOGREL BISULFATE 75 MG/1
75 TABLET ORAL DAILY
COMMUNITY
End: 2023-01-09 | Stop reason: SDUPTHER

## 2022-09-20 NOTE — PROGRESS NOTES
Past Medical History:   Diagnosis Date    Essential (primary) hypertension     High cholesterol     Hyperlipidemia     Morbid obesity     Stroke     Stroke     Unspecified osteoarthritis, unspecified site        Past Surgical History:   Procedure Laterality Date    JOINT REPLACEMENT  02/07/2023    left total knee      right total knee      TOTAL KNEE ARTHROPLASTY Bilateral        Current Outpatient Medications   Medication Sig    albuterol (VENTOLIN HFA) 90 mcg/actuation inhaler Inhale 2 puffs into the lungs every 6 (six) hours as needed for Wheezing. Rescue    aspirin (ECOTRIN) 81 MG EC tablet Take 1 tablet (81 mg total) by mouth once daily.    atenoloL (TENORMIN) 25 MG tablet Take 1 tablet (25 mg total) by mouth once daily.    cetirizine (ZYRTEC) 10 MG tablet Take 1 tablet (10 mg total) by mouth daily as needed.    clopidogreL (PLAVIX) 75 mg tablet Take 75 mg by mouth once daily.    lisinopriL-hydrochlorothiazide (PRINZIDE,ZESTORETIC) 20-25 mg Tab Take 1 tablet by mouth once daily.    pravastatin (PRAVACHOL) 20 MG tablet Take 1 tablet (20 mg total) by mouth once daily.    gabapentin (NEURONTIN) 300 MG capsule Take 300 mg by mouth 2 (two) times daily.    hydrOXYzine HCL (ATARAX) 25 MG tablet Take 25 mg by mouth.    hydrOXYzine pamoate (VISTARIL) 25 MG Cap Take 25 mg by mouth 3 (three) times daily as needed.    ipratropium-albuteroL (COMBIVENT RESPIMAT)  mcg/actuation inhaler Inhale into the lungs.    methocarbamoL (ROBAXIN) 750 MG Tab Take 750 mg by mouth.    oxycodone HCl/acetaminophen (PERCOCET ORAL) Take by mouth.    traMADoL (ULTRAM) 50 mg tablet Take 50 mg by mouth every 6 (six) hours as needed.     No current facility-administered medications for this visit.       Review of patient's allergies indicates:  No Known Allergies    Family History   Problem Relation Age of Onset    COPD Mother     Hypertension Father     COPD Father     Stroke Brother        Social History     Socioeconomic History     Marital status: Single   Tobacco Use    Smoking status: Every Day     Packs/day: 0.25     Types: Cigarettes    Smokeless tobacco: Never    Tobacco comments:     patient refused counseling   Substance and Sexual Activity    Alcohol use: Not Currently    Drug use: Not Currently    Sexual activity: Not Currently     Social Determinants of Health     Financial Resource Strain: Low Risk     Difficulty of Paying Living Expenses: Not hard at all   Food Insecurity: No Food Insecurity    Worried About Running Out of Food in the Last Year: Never true    Ran Out of Food in the Last Year: Never true   Transportation Needs: No Transportation Needs    Lack of Transportation (Medical): No    Lack of Transportation (Non-Medical): No   Physical Activity: Inactive    Days of Exercise per Week: 0 days    Minutes of Exercise per Session: 0 min   Stress: No Stress Concern Present    Feeling of Stress : Not at all   Social Connections: Socially Isolated    Frequency of Communication with Friends and Family: More than three times a week    Frequency of Social Gatherings with Friends and Family: More than three times a week    Attends Gnosticism Services: Never    Active Member of Clubs or Organizations: No    Attends Club or Organization Meetings: Never    Marital Status: Never    Housing Stability: Low Risk     Unable to Pay for Housing in the Last Year: No    Number of Places Lived in the Last Year: 1    Unstable Housing in the Last Year: No       Chief Complaint:   Chief Complaint   Patient presents with    Follow-up     3 mo f/u left TKA on 2/7/22, reports some pain when walking       History of present illness:  62-year-old male presents for follow-up of left knee fusion.  Overall patient is doing fairly well.  He is walking well.  Has some very mild pain with prolonged ambulation.  Otherwise is happy with his recovery.  About 8 months out.      Review of Systems:    Constitution: Negative for chills, fever, and sweats.   Negative for unexplained weight loss.    HENT:  Negative for headaches and blurry vision.    Cardiovascular:Negative for chest pain or irregular heart beat. Negative for hypertension.    Respiratory:  Negative for cough and shortness of breath.    Gastrointestinal: Negative for abdominal pain, heartburn, melena, nausea, and vomitting.    Genitourinary:  Negative bladder incontinence and dysuria.    Musculoskeletal:  See HPI    Neurological: Negative for numbness.    Psychiatric/Behavioral: Negative for depression.  The patient is not nervous/anxious.      Endocrine: Negative for polyuria    Hematologic/Lymphatic: Negative for bleeding problem.  Does not bruise/bleed easily.    Skin: Negative for poor would healing and rash      Physical Examination:    Vital Signs:    Vitals:    09/20/22 0841   BP: 135/89   Pulse: 74       Body mass index is 36.04 kg/m².    General: No acute distress, alert and oriented, healthy appearing    HEENT: Head is atraumatic, mucous membranes are moist    Neck: Supples, no JVD    Cardiovascular: Palpable dorsalis pedis and posterior tibial pulses, regular rate and rhythm to those pulses    Lungs: Breathing non-labored    Skin: no rashes appreciated    Neurologic: Can flex and extend knees, ankles, and toes. Sensation is grossly intact    Left knee:  Patient neurovascular intact distally the left foot.  No range of motion left knee operatively since he has any fusion.    X-rays:  Three views of the left hip including three views of his left knee were taken today and reviewed.  Patient's knee fusion is well aligned.  There is no signs of hardware failure.  Does have slight change in position of 1 of his proximal screws although still holding strong.     Assessment::  Status post left knee fusion    Plan:  Overall patient doing fairly well.  We will plan to see him back with repeat x-rays of his left knee/femur/tibia and about 5-6 months.    This note was created using [employer] voice  recognition software that occasionally misinterpreted phrases or words.    Consult note is delivered via Epic messaging service.

## 2022-12-13 ENCOUNTER — PATIENT MESSAGE (OUTPATIENT)
Dept: RESEARCH | Facility: HOSPITAL | Age: 62
End: 2022-12-13
Payer: MEDICARE

## 2023-01-03 ENCOUNTER — LAB VISIT (OUTPATIENT)
Dept: LAB | Facility: HOSPITAL | Age: 63
End: 2023-01-03
Attending: STUDENT IN AN ORGANIZED HEALTH CARE EDUCATION/TRAINING PROGRAM
Payer: MEDICARE

## 2023-01-03 DIAGNOSIS — I63.9 CEREBROVASCULAR ACCIDENT (CVA), UNSPECIFIED MECHANISM: ICD-10-CM

## 2023-01-03 DIAGNOSIS — E78.5 HYPERLIPIDEMIA, UNSPECIFIED HYPERLIPIDEMIA TYPE: ICD-10-CM

## 2023-01-03 DIAGNOSIS — Z23 NEED FOR SHINGLES VACCINE: ICD-10-CM

## 2023-01-03 DIAGNOSIS — I10 HYPERTENSION, UNSPECIFIED TYPE: ICD-10-CM

## 2023-01-03 DIAGNOSIS — Z72.0 TOBACCO USE: ICD-10-CM

## 2023-01-03 DIAGNOSIS — M15.9 OSTEOARTHRITIS OF MULTIPLE JOINTS, UNSPECIFIED OSTEOARTHRITIS TYPE: ICD-10-CM

## 2023-01-03 DIAGNOSIS — E55.9 VITAMIN D DEFICIENCY: ICD-10-CM

## 2023-01-03 LAB
ALBUMIN SERPL-MCNC: 3.7 G/DL (ref 3.4–4.8)
ALBUMIN/GLOB SERPL: 1 RATIO (ref 1.1–2)
ALP SERPL-CCNC: 75 UNIT/L (ref 40–150)
ALT SERPL-CCNC: 14 UNIT/L (ref 0–55)
APPEARANCE UR: CLEAR
AST SERPL-CCNC: 16 UNIT/L (ref 5–34)
BACTERIA #/AREA URNS AUTO: ABNORMAL /HPF
BASOPHILS # BLD AUTO: 0.04 X10(3)/MCL (ref 0–0.2)
BASOPHILS NFR BLD AUTO: 0.5 %
BILIRUB UR QL STRIP.AUTO: NEGATIVE MG/DL
BILIRUBIN DIRECT+TOT PNL SERPL-MCNC: 0.3 MG/DL
BUN SERPL-MCNC: 23.6 MG/DL (ref 8.4–25.7)
CALCIUM SERPL-MCNC: 9.4 MG/DL (ref 8.8–10)
CHLORIDE SERPL-SCNC: 104 MMOL/L (ref 98–107)
CO2 SERPL-SCNC: 28 MMOL/L (ref 23–31)
COLOR UR AUTO: ABNORMAL
CREAT SERPL-MCNC: 1.42 MG/DL (ref 0.73–1.18)
CREAT UR-MCNC: 135.8 MG/DL (ref 63–166)
DEPRECATED CALCIDIOL+CALCIFEROL SERPL-MC: 23.7 NG/ML (ref 30–80)
EOSINOPHIL # BLD AUTO: 1.03 X10(3)/MCL (ref 0–0.9)
EOSINOPHIL NFR BLD AUTO: 12.5 %
ERYTHROCYTE [DISTWIDTH] IN BLOOD BY AUTOMATED COUNT: 15.9 % (ref 11.6–14.4)
GFR SERPLBLD CREATININE-BSD FMLA CKD-EPI: 55 MLS/MIN/1.73/M2
GLOBULIN SER-MCNC: 3.7 GM/DL (ref 2.4–3.5)
GLUCOSE SERPL-MCNC: 92 MG/DL (ref 82–115)
GLUCOSE UR QL STRIP.AUTO: NORMAL MG/DL
HCT VFR BLD AUTO: 44.5 % (ref 42–52)
HGB BLD-MCNC: 14.1 GM/DL (ref 14–18)
HYALINE CASTS #/AREA URNS LPF: ABNORMAL /LPF
IMM GRANULOCYTES # BLD AUTO: 0.04 X10(3)/MCL (ref 0–0.04)
IMM GRANULOCYTES NFR BLD AUTO: 0.5 %
KETONES UR QL STRIP.AUTO: NEGATIVE MG/DL
LEUKOCYTE ESTERASE UR QL STRIP.AUTO: NEGATIVE UNIT/L
LYMPHOCYTES # BLD AUTO: 1.68 X10(3)/MCL (ref 0.6–4.6)
LYMPHOCYTES NFR BLD AUTO: 20.3 %
MCH RBC QN AUTO: 25.8 PG
MCHC RBC AUTO-ENTMCNC: 31.7 MG/DL (ref 33–36)
MCV RBC AUTO: 81.4 FL (ref 80–94)
MONOCYTES # BLD AUTO: 0.68 X10(3)/MCL (ref 0.1–1.3)
MONOCYTES NFR BLD AUTO: 8.2 %
MUCOUS THREADS URNS QL MICRO: ABNORMAL /LPF
NEUTROPHILS # BLD AUTO: 4.79 X10(3)/MCL (ref 2.1–9.2)
NEUTROPHILS NFR BLD AUTO: 58 %
NITRITE UR QL STRIP.AUTO: NEGATIVE
NRBC BLD AUTO-RTO: 0 % (ref 0–1)
PH UR STRIP.AUTO: 5.5 [PH]
PLATELET # BLD AUTO: 277 X10(3)/MCL (ref 140–371)
PMV BLD AUTO: 11.6 FL (ref 9.4–12.4)
POTASSIUM SERPL-SCNC: 3.8 MMOL/L (ref 3.5–5.1)
PROT SERPL-MCNC: 7.4 GM/DL (ref 5.8–7.6)
PROT UR QL STRIP.AUTO: NEGATIVE MG/DL
PROT UR STRIP-MCNC: <6.8 MG/DL
RBC # BLD AUTO: 5.47 X10(6)/MCL (ref 4.7–6.1)
RBC #/AREA URNS AUTO: ABNORMAL /HPF
RBC UR QL AUTO: NEGATIVE UNIT/L
SODIUM SERPL-SCNC: 141 MMOL/L (ref 136–145)
SP GR UR STRIP.AUTO: 1.02
SQUAMOUS #/AREA URNS LPF: ABNORMAL /HPF
UROBILINOGEN UR STRIP-ACNC: NORMAL MG/DL
WBC # SPEC AUTO: 8.3 X10(3)/MCL (ref 4.5–11.5)
WBC #/AREA URNS AUTO: ABNORMAL /HPF

## 2023-01-03 PROCEDURE — 80053 COMPREHEN METABOLIC PANEL: CPT

## 2023-01-03 PROCEDURE — 85025 COMPLETE CBC W/AUTO DIFF WBC: CPT

## 2023-01-03 PROCEDURE — 82570 ASSAY OF URINE CREATININE: CPT

## 2023-01-03 PROCEDURE — 81001 URINALYSIS AUTO W/SCOPE: CPT

## 2023-01-03 PROCEDURE — 36415 COLL VENOUS BLD VENIPUNCTURE: CPT

## 2023-01-03 PROCEDURE — 82306 VITAMIN D 25 HYDROXY: CPT

## 2023-01-09 ENCOUNTER — OFFICE VISIT (OUTPATIENT)
Dept: INTERNAL MEDICINE | Facility: CLINIC | Age: 63
End: 2023-01-09
Payer: MEDICARE

## 2023-01-09 VITALS
WEIGHT: 246 LBS | SYSTOLIC BLOOD PRESSURE: 109 MMHG | HEIGHT: 68 IN | HEART RATE: 76 BPM | RESPIRATION RATE: 18 BRPM | BODY MASS INDEX: 37.28 KG/M2 | DIASTOLIC BLOOD PRESSURE: 69 MMHG | TEMPERATURE: 98 F

## 2023-01-09 DIAGNOSIS — I10 HYPERTENSION, UNSPECIFIED TYPE: ICD-10-CM

## 2023-01-09 DIAGNOSIS — Z23 NEED FOR SHINGLES VACCINE: Primary | ICD-10-CM

## 2023-01-09 PROCEDURE — 99214 OFFICE O/P EST MOD 30 MIN: CPT | Mod: PBBFAC

## 2023-01-09 PROCEDURE — 90471 IMMUNIZATION ADMIN: CPT | Mod: PBBFAC

## 2023-01-09 PROCEDURE — 90750 HZV VACC RECOMBINANT IM: CPT | Mod: PBBFAC

## 2023-01-09 RX ORDER — ASPIRIN 81 MG/1
81 TABLET ORAL DAILY
Qty: 90 TABLET | Refills: 3 | Status: SHIPPED | OUTPATIENT
Start: 2023-01-09

## 2023-01-09 RX ORDER — ALBUTEROL SULFATE 90 UG/1
2 AEROSOL, METERED RESPIRATORY (INHALATION) EVERY 6 HOURS PRN
Qty: 6.7 G | Refills: 2 | Status: SHIPPED | OUTPATIENT
Start: 2023-01-09 | End: 2023-08-24 | Stop reason: SDUPTHER

## 2023-01-09 RX ORDER — PRAVASTATIN SODIUM 20 MG/1
20 TABLET ORAL DAILY
Qty: 90 TABLET | Refills: 3 | Status: SHIPPED | OUTPATIENT
Start: 2023-01-09 | End: 2023-10-17 | Stop reason: SDUPTHER

## 2023-01-09 RX ORDER — LISINOPRIL AND HYDROCHLOROTHIAZIDE 20; 25 MG/1; MG/1
1 TABLET ORAL DAILY
Qty: 90 TABLET | Refills: 3 | Status: SHIPPED | OUTPATIENT
Start: 2023-01-09 | End: 2024-01-11 | Stop reason: SDUPTHER

## 2023-01-09 RX ORDER — CLOPIDOGREL BISULFATE 75 MG/1
75 TABLET ORAL DAILY
Qty: 30 TABLET | Refills: 5 | Status: SHIPPED | OUTPATIENT
Start: 2023-01-09 | End: 2023-05-29

## 2023-01-09 RX ORDER — ATENOLOL 25 MG/1
25 TABLET ORAL DAILY
Qty: 90 TABLET | Refills: 3 | Status: SHIPPED | OUTPATIENT
Start: 2023-01-09 | End: 2024-01-11 | Stop reason: SDUPTHER

## 2023-01-09 RX ORDER — ACETAMINOPHEN 500 MG
2000 TABLET ORAL DAILY
Qty: 30 CAPSULE | Refills: 3 | Status: SHIPPED | OUTPATIENT
Start: 2023-01-09

## 2023-01-09 NOTE — PROGRESS NOTES
"[facility] INTERNAL MEDICINE  OUTPATIENT OFFICE VISIT NOTE    SUBJECTIVE:      HPI: ISABELLE Frank is a 62 y.o. yo male w/ PMH of PMH of CVA (w/ residual LUE weakness), HTN, HLD, Obesity, Tobacco Use, and Arthritis (B/L knee arthroplasty) who presents to [facility] for follow up visit. Last visit (8/2022) in which he complained of chronic bronchitis/sinusitis w/ productive cough of clear sputum (x10 years) w/ no prior PFT on file. PFTs completed & revealed moderate obstructive pattern with reversibility post-bronchodilator (15%) indicating asthma/reactive airway disease. He reports prior trial of albuterol inhaler worked well without excessive use (lasted approximately x3 months).Is still smoking cigarettes (x2-3/day; reduced from x4-5/day last appt, is trying to quit). Has upcomming ortho appointment for possible carpal tunnel syndrome. Patient used to follow dermatologist at outside facility but has been lost to follow-up; requesting referral today. He currently denies chest pain, shortness of breath, fever, chills, nausea, vomiting, unexpected weight loss, hemoptysis/hematemesis/hematochezia/melena, orthopnea/PND/LE swelling.     ROS:  10 point ROS performed and negative other than stated above.      OBJECTIVE:     Vital signs:   /69 (BP Location: Right arm, Patient Position: Sitting, BP Method: Large (Automatic))   Pulse 76   Temp 98.1 °F (36.7 °C) (Oral)   Resp 18   Ht 5' 8" (1.727 m)   Wt 111.6 kg (246 lb)   BMI 37.40 kg/m²      Physical Examination:  General: Well nourished w/o distress  HEENT: NC/AT; PERRLA; nasal and oral mucosa moist and clear; no sinus tenderness; no thyromegaly  Neck: Full ROM; no lymphadenopathy  Pulm: CTA bilaterally, normal work of breathing  CV: S1, S2 w/o murmurs or gallops; no edema noted  GI: Soft with normal bowel sounds in all quadrants, no masses on palpation  MSK: LLE knee fusion in extended position with inability to flex. B/L midline anterior knee scars " "present & well healed. Positive Tinel & Phalen Test (right wrist)  Derm: Multiple chronic lesions (b/l UE/LE) which are chronic/non-tender/non-pustular  Neuro: AAOx4; CN II-XII intact; 4/5 LUE motor, preserved motor throughout remaining motor/sensory function intact  Psych: Cooperative; appropriate mood and affect    ASSESSMENT & PLAN:   HTN - well controlled  - BP at goal (109/69)  - Continue Atenolol 25 mg daily, HCTZ-Lisinopril 25-20mg daily  - Recommended continuing DASH diet, pt is agreeable with plan     Osteoarthritis  Suspected Right Carpal Tunnel Syndrome   - B/L Knee Arthroplasty w/ multiple revisions   - Pt followed by ortho; s/p Left Knee Fusion (2/2022), recovering well   - Is able to ambulate with walker, mostly uses wheelchair at home   - Recommended nightly right wrist brace until further evaluation of carpal tunnel by orthpedics   - Continue to f/u with Ortho, appreciate recommendations    B/L UE/LE lesions; suspicious for keratosis pilaris  - Reports having lesions since childhood  - Previously followed by outside facility Dermatologist  - States previous prescription of "mixed" ointment that assisted with flares/pruritis/pain though was too expensive  - Will refer to [facility] D[facility]at[facility] for further evaluation     Hx of CVA   - Residual deficit of (left UE weakness)   - Discontinuing Plavix 75 as therapy duration complete   - Initiated Aspirin 81 daily        -- At prior appointment; patient decided to anti-platelet therapy with Plavix 75 only s/t elevated fall risk s/p recent knee procedure instead of Aspirin        -- Agreeable with above plan    Tobacco User  Chronic Bronchitis  Suspected COPD   - Reduced from 1/2 ppd to x4-5 cigarettes daily since   - 30 pack year smoker, not interested in smoking cessation assistance at this time   - PFTs (8/2022) suspicious for mild obstructive disease   -- patient not experiencing SOB at rest or NICHOLAS   - Annual Low-Dose CT Thorax (benign w/ emphysematous " changes)   - Discussed prescribing rescue inhaler at this time & utilization PRN; patient is agreeable   -- If symptoms worsen or patient uses albuterol inhaler at increasing rates at next office visit; plan to prescribe maintenance MDI therapy    HLD  - fasting lipid profile at goal  - Continue Pravastatin 20 mg daily         Health Maintenance:  Immunization History   Administered Date(s) Administered    COVID-19, MRNA, LN-S, PF (Pfizer) (Purple Cap) 02/24/2021, 03/17/2021, 10/20/2021    Pneumococcal Conjugate - 13 Valent 05/30/2022    Tdap 05/30/2022    Zoster Recombinant 08/25/2022, 01/09/2023   Colonoscopy  12/2016  -- Cologuard today (8/25/22)  Low-Dose CT Scan - Up to Date (8/2022)    Return to clinic in 4 months    Stuart Singer MD  [facility] Internal Medicine, PGY-2

## 2023-01-11 NOTE — PROGRESS NOTES
Attending Addendum:   Patient seen and examined in clinic. Management and Plan were discussed with resident. Care was reasonable and necessary.   Claribel Sanchez MD  [facility] - Internal Medicine

## 2023-02-08 ENCOUNTER — PATIENT MESSAGE (OUTPATIENT)
Dept: RESEARCH | Facility: HOSPITAL | Age: 63
End: 2023-02-08
Payer: MEDICARE

## 2023-02-23 ENCOUNTER — OFFICE VISIT (OUTPATIENT)
Dept: ORTHOPEDICS | Facility: CLINIC | Age: 63
End: 2023-02-23
Payer: MEDICARE

## 2023-02-23 ENCOUNTER — HOSPITAL ENCOUNTER (OUTPATIENT)
Dept: RADIOLOGY | Facility: CLINIC | Age: 63
Discharge: HOME | End: 2023-02-23
Attending: NURSE PRACTITIONER
Payer: MEDICARE

## 2023-02-23 VITALS
DIASTOLIC BLOOD PRESSURE: 75 MMHG | WEIGHT: 248 LBS | HEIGHT: 68 IN | BODY MASS INDEX: 37.59 KG/M2 | SYSTOLIC BLOOD PRESSURE: 113 MMHG | HEART RATE: 74 BPM

## 2023-02-23 DIAGNOSIS — Z47.1 AFTERCARE FOLLOWING LEFT KNEE JOINT REPLACEMENT SURGERY: Primary | ICD-10-CM

## 2023-02-23 DIAGNOSIS — Z47.1 AFTERCARE FOLLOWING LEFT KNEE JOINT REPLACEMENT SURGERY: ICD-10-CM

## 2023-02-23 DIAGNOSIS — Z96.652 AFTERCARE FOLLOWING LEFT KNEE JOINT REPLACEMENT SURGERY: Primary | ICD-10-CM

## 2023-02-23 DIAGNOSIS — Z96.652 AFTERCARE FOLLOWING LEFT KNEE JOINT REPLACEMENT SURGERY: ICD-10-CM

## 2023-02-23 PROCEDURE — 73560 XR KNEE 1 OR 2 VIEW LEFT: ICD-10-PCS | Mod: LT,,, | Performed by: NURSE PRACTITIONER

## 2023-02-23 PROCEDURE — 73560 X-RAY EXAM OF KNEE 1 OR 2: CPT | Mod: LT,,, | Performed by: NURSE PRACTITIONER

## 2023-02-23 PROCEDURE — 99213 OFFICE O/P EST LOW 20 MIN: CPT | Mod: ,,, | Performed by: NURSE PRACTITIONER

## 2023-02-23 PROCEDURE — 99213 PR OFFICE/OUTPT VISIT, EST, LEVL III, 20-29 MIN: ICD-10-PCS | Mod: ,,, | Performed by: NURSE PRACTITIONER

## 2023-02-23 NOTE — PROGRESS NOTES
Past Medical History:   Diagnosis Date    Essential (primary) hypertension     High cholesterol     Hyperlipidemia     Morbid obesity     Rheumatoid arthritis, unspecified     Stroke     Unspecified osteoarthritis, unspecified site        Past Surgical History:   Procedure Laterality Date    JOINT REPLACEMENT  02/07/2023    left total knee      right total knee      TOTAL KNEE ARTHROPLASTY Bilateral        Current Outpatient Medications   Medication Sig    albuterol (VENTOLIN HFA) 90 mcg/actuation inhaler Inhale 2 puffs into the lungs every 6 (six) hours as needed for Wheezing. Rescue    aspirin (ECOTRIN) 81 MG EC tablet Take 1 tablet (81 mg total) by mouth once daily.    atenoloL (TENORMIN) 25 MG tablet Take 1 tablet (25 mg total) by mouth once daily.    cetirizine (ZYRTEC) 10 MG tablet Take 1 tablet (10 mg total) by mouth daily as needed.    cholecalciferol, vitamin D3, (VITAMIN D3) 50 mcg (2,000 unit) Cap capsule Take 1 capsule (2,000 Units total) by mouth once daily.    clopidogreL (PLAVIX) 75 mg tablet Take 1 tablet (75 mg total) by mouth once daily.    gabapentin (NEURONTIN) 300 MG capsule Take 300 mg by mouth 2 (two) times daily.    hydrOXYzine HCL (ATARAX) 25 MG tablet Take 25 mg by mouth.    hydrOXYzine pamoate (VISTARIL) 25 MG Cap Take 25 mg by mouth 3 (three) times daily as needed.    lisinopriL-hydrochlorothiazide (PRINZIDE,ZESTORETIC) 20-25 mg Tab Take 1 tablet by mouth once daily.    methocarbamoL (ROBAXIN) 750 MG Tab Take 750 mg by mouth.    oxycodone HCl/acetaminophen (PERCOCET ORAL) Take by mouth.    pravastatin (PRAVACHOL) 20 MG tablet Take 1 tablet (20 mg total) by mouth once daily.    traMADoL (ULTRAM) 50 mg tablet Take 50 mg by mouth every 6 (six) hours as needed.     No current facility-administered medications for this visit.       Review of patient's allergies indicates:  No Known Allergies    Family History   Problem Relation Age of Onset    COPD Mother     Hypertension Father     COPD  Father     Stroke Brother        Social History     Socioeconomic History    Marital status: Single   Tobacco Use    Smoking status: Every Day     Packs/day: 0.25     Types: Cigarettes    Smokeless tobacco: Never    Tobacco comments:     patient refused counseling   Substance and Sexual Activity    Alcohol use: Not Currently    Drug use: Not Currently    Sexual activity: Not Currently     Social Determinants of Health     Financial Resource Strain: Low Risk     Difficulty of Paying Living Expenses: Not hard at all   Food Insecurity: No Food Insecurity    Worried About Running Out of Food in the Last Year: Never true    Ran Out of Food in the Last Year: Never true   Transportation Needs: No Transportation Needs    Lack of Transportation (Medical): No    Lack of Transportation (Non-Medical): No   Physical Activity: Inactive    Days of Exercise per Week: 0 days    Minutes of Exercise per Session: 0 min   Stress: No Stress Concern Present    Feeling of Stress : Not at all   Social Connections: Socially Isolated    Frequency of Communication with Friends and Family: More than three times a week    Frequency of Social Gatherings with Friends and Family: More than three times a week    Attends Baptism Services: Never    Active Member of Clubs or Organizations: No    Attends Club or Organization Meetings: Never    Marital Status: Never    Housing Stability: Low Risk     Unable to Pay for Housing in the Last Year: No    Number of Places Lived in the Last Year: 1    Unstable Housing in the Last Year: No       Chief Complaint:   Chief Complaint   Patient presents with    Follow-up     Pt states he is doing well, reports a mild pain in his chin bone when he is walking. Pt has no major complaints today.       History of present illness:  62-year-old male presents for 1 year follow-up of left knee fusion.  Overall patient is doing fairly well.  He is walking well with assistance.  Has some very mild pain along the  anterior distal shin with prolonged ambulation.  Otherwise is happy with his recovery.      Review of Systems:    Constitution: Negative for chills, fever, and sweats.  Negative for unexplained weight loss.    HENT:  Negative for headaches and blurry vision.    Cardiovascular:Negative for chest pain or irregular heart beat. Negative for hypertension.    Respiratory:  Negative for cough and shortness of breath.    Gastrointestinal: Negative for abdominal pain, heartburn, melena, nausea, and vomitting.    Genitourinary:  Negative bladder incontinence and dysuria.    Musculoskeletal:  See HPI    Neurological: Negative for numbness.    Psychiatric/Behavioral: Negative for depression.  The patient is not nervous/anxious.      Endocrine: Negative for polyuria    Hematologic/Lymphatic: Negative for bleeding problem.  Does not bruise/bleed easily.    Skin: Negative for poor would healing and rash      Physical Examination:    Vital Signs:    Vitals:    02/23/23 1033   BP: 113/75   Pulse: 74       Body mass index is 37.71 kg/m².    General: No acute distress, alert and oriented, healthy appearing    HEENT: Head is atraumatic, mucous membranes are moist    Neck: Supples, no JVD    Cardiovascular: Palpable dorsalis pedis and posterior tibial pulses, regular rate and rhythm to those pulses    Lungs: Breathing non-labored    Skin: no rashes appreciated    Neurologic: Can flex and extend knees, ankles, and toes. Sensation is grossly intact    Left knee:  Patient neurovascular intact distally the left foot.  No range of motion left knee operatively since he has any fusion.    X-rays:  Three views of the left hip including three views of his left knee were taken today and reviewed.  Patient's knee fusion is well aligned.  There is no signs of hardware failure.  No change in position of 1 of his proximal screws that was noted upon last xray.     Assessment::  Status post left knee fusion    Plan:  Overall patient doing fairly well.   We will plan to see him back with repeat x-rays of his left knee/femur/tibia and about 5-6 months.    This note was created using [employer] voice recognition software that occasionally misinterpreted phrases or words.    Consult note is delivered via Epic messaging service.

## 2023-04-12 LAB
CREAT UR-MCNC: 91 MG/DL (ref 63–166)
PROT UR STRIP-MCNC: <6.8 MG/DL

## 2023-05-29 ENCOUNTER — LAB VISIT (OUTPATIENT)
Dept: LAB | Facility: HOSPITAL | Age: 63
End: 2023-05-29
Attending: STUDENT IN AN ORGANIZED HEALTH CARE EDUCATION/TRAINING PROGRAM
Payer: MEDICARE

## 2023-05-29 ENCOUNTER — OFFICE VISIT (OUTPATIENT)
Dept: INTERNAL MEDICINE | Facility: CLINIC | Age: 63
End: 2023-05-29
Payer: MEDICARE

## 2023-05-29 VITALS
BODY MASS INDEX: 36.58 KG/M2 | HEIGHT: 68 IN | TEMPERATURE: 98 F | SYSTOLIC BLOOD PRESSURE: 120 MMHG | OXYGEN SATURATION: 99 % | RESPIRATION RATE: 18 BRPM | DIASTOLIC BLOOD PRESSURE: 82 MMHG | HEART RATE: 71 BPM | WEIGHT: 241.38 LBS

## 2023-05-29 DIAGNOSIS — I63.9 CEREBROVASCULAR ACCIDENT (CVA), UNSPECIFIED MECHANISM: ICD-10-CM

## 2023-05-29 DIAGNOSIS — J45.909 ASTHMA, UNSPECIFIED ASTHMA SEVERITY, UNSPECIFIED WHETHER COMPLICATED, UNSPECIFIED WHETHER PERSISTENT: ICD-10-CM

## 2023-05-29 DIAGNOSIS — E55.9 VITAMIN D DEFICIENCY: ICD-10-CM

## 2023-05-29 DIAGNOSIS — I10 HYPERTENSION, UNSPECIFIED TYPE: ICD-10-CM

## 2023-05-29 DIAGNOSIS — Z87.891 PERSONAL HISTORY OF NICOTINE DEPENDENCE: ICD-10-CM

## 2023-05-29 DIAGNOSIS — M19.90 OSTEOARTHRITIS, UNSPECIFIED OSTEOARTHRITIS TYPE, UNSPECIFIED SITE: ICD-10-CM

## 2023-05-29 DIAGNOSIS — Z12.2 SCREENING FOR LUNG CANCER: ICD-10-CM

## 2023-05-29 DIAGNOSIS — Z11.59 ENCOUNTER FOR HCV SCREENING TEST FOR LOW RISK PATIENT: ICD-10-CM

## 2023-05-29 DIAGNOSIS — L85.8 KERATOSIS PILARIS: ICD-10-CM

## 2023-05-29 DIAGNOSIS — Z72.0 TOBACCO ABUSE: ICD-10-CM

## 2023-05-29 DIAGNOSIS — E78.5 HYPERLIPIDEMIA, UNSPECIFIED HYPERLIPIDEMIA TYPE: ICD-10-CM

## 2023-05-29 DIAGNOSIS — Z12.2 ENCOUNTER FOR SCREENING FOR LUNG CANCER: ICD-10-CM

## 2023-05-29 DIAGNOSIS — I10 HYPERTENSION, UNSPECIFIED TYPE: Primary | ICD-10-CM

## 2023-05-29 LAB
ALBUMIN SERPL-MCNC: 3.9 G/DL (ref 3.4–4.8)
ALBUMIN/GLOB SERPL: 1.2 RATIO (ref 1.1–2)
ALP SERPL-CCNC: 83 UNIT/L (ref 40–150)
ALT SERPL-CCNC: 17 UNIT/L (ref 0–55)
AST SERPL-CCNC: 15 UNIT/L (ref 5–34)
BASOPHILS # BLD AUTO: 0.05 X10(3)/MCL
BASOPHILS NFR BLD AUTO: 0.6 %
BILIRUBIN DIRECT+TOT PNL SERPL-MCNC: 0.5 MG/DL
BUN SERPL-MCNC: 10.2 MG/DL (ref 8.4–25.7)
CALCIUM SERPL-MCNC: 9.6 MG/DL (ref 8.8–10)
CHLORIDE SERPL-SCNC: 102 MMOL/L (ref 98–107)
CO2 SERPL-SCNC: 27 MMOL/L (ref 23–31)
CREAT SERPL-MCNC: 1.28 MG/DL (ref 0.73–1.18)
DEPRECATED CALCIDIOL+CALCIFEROL SERPL-MC: 38.1 NG/ML (ref 30–80)
EOSINOPHIL # BLD AUTO: 0.99 X10(3)/MCL (ref 0–0.9)
EOSINOPHIL NFR BLD AUTO: 11.3 %
ERYTHROCYTE [DISTWIDTH] IN BLOOD BY AUTOMATED COUNT: 16.6 % (ref 11.5–17)
GFR SERPLBLD CREATININE-BSD FMLA CKD-EPI: >60 MLS/MIN/1.73/M2
GLOBULIN SER-MCNC: 3.3 GM/DL (ref 2.4–3.5)
GLUCOSE SERPL-MCNC: 92 MG/DL (ref 82–115)
HCT VFR BLD AUTO: 44.6 % (ref 42–52)
HCV AB SERPL QL IA: NONREACTIVE
HGB BLD-MCNC: 14.1 G/DL (ref 14–18)
IMM GRANULOCYTES # BLD AUTO: 0.06 X10(3)/MCL (ref 0–0.04)
IMM GRANULOCYTES NFR BLD AUTO: 0.7 %
LYMPHOCYTES # BLD AUTO: 1.63 X10(3)/MCL (ref 0.6–4.6)
LYMPHOCYTES NFR BLD AUTO: 18.5 %
MCH RBC QN AUTO: 26 PG (ref 27–31)
MCHC RBC AUTO-ENTMCNC: 31.6 G/DL (ref 33–36)
MCV RBC AUTO: 82.1 FL (ref 80–94)
MONOCYTES # BLD AUTO: 0.56 X10(3)/MCL (ref 0.1–1.3)
MONOCYTES NFR BLD AUTO: 6.4 %
NEUTROPHILS # BLD AUTO: 5.5 X10(3)/MCL (ref 2.1–9.2)
NEUTROPHILS NFR BLD AUTO: 62.5 %
NRBC BLD AUTO-RTO: 0 %
PLATELET # BLD AUTO: 327 X10(3)/MCL (ref 130–400)
PMV BLD AUTO: 11.3 FL (ref 7.4–10.4)
POTASSIUM SERPL-SCNC: 4.1 MMOL/L (ref 3.5–5.1)
PROT SERPL-MCNC: 7.2 GM/DL (ref 5.8–7.6)
RBC # BLD AUTO: 5.43 X10(6)/MCL (ref 4.7–6.1)
SODIUM SERPL-SCNC: 142 MMOL/L (ref 136–145)
WBC # SPEC AUTO: 8.79 X10(3)/MCL (ref 4.5–11.5)

## 2023-05-29 PROCEDURE — 80053 COMPREHEN METABOLIC PANEL: CPT

## 2023-05-29 PROCEDURE — 99215 OFFICE O/P EST HI 40 MIN: CPT | Mod: PBBFAC

## 2023-05-29 PROCEDURE — 85025 COMPLETE CBC W/AUTO DIFF WBC: CPT

## 2023-05-29 PROCEDURE — 86803 HEPATITIS C AB TEST: CPT

## 2023-05-29 PROCEDURE — 36415 COLL VENOUS BLD VENIPUNCTURE: CPT

## 2023-05-29 PROCEDURE — 82306 VITAMIN D 25 HYDROXY: CPT

## 2023-05-29 RX ORDER — FLUTICASONE FUROATE AND VILANTEROL 100; 25 UG/1; UG/1
1 POWDER RESPIRATORY (INHALATION) DAILY
Qty: 60 EACH | Refills: 3 | Status: SHIPPED | OUTPATIENT
Start: 2023-05-29 | End: 2023-05-30 | Stop reason: ALTCHOICE

## 2023-05-29 NOTE — PROGRESS NOTES
"[facility] INTERNAL MEDICINE  OUTPATIENT OFFICE VISIT NOTE    SUBJECTIVE:      HPI: German Frank is a 62 y.o. yo male w/ PMH of PMH of CVA (w/ residual LUE weakness), HTN, HLD, Obesity, Tobacco Use, and Arthritis (B/L knee arthroplasty) who presents to [facility] for follow up visit.     Since last appointment, patient reports continued intermittent episodes of bronchitis/sinusitis with productive cough of clear sputum.  PFTs completed that time revealing obstructive pattern with 15% reversibility post-bronchodilator indicating asthma/reactive airway disease.  Trial of albuterol inhaler as patient is utilizing a proximally x2 occasions per day with relief.  Due to continued inflammatory like reactions; discussed introducing Laba/ics inhaler for maintenance therapy in which patient is agreeable.  Otherwise, patient reports feeling well overall and is requesting referral to [facility] dermatology for further treatment of suspected keratosis pilaris.  He states he used to follow with Dermatology many years ago in which a compound (oil/cream) was utilized to control his pruritus though is currently unsure of this compounds name/ He currently denies chest pain, shortness of breath, fever, chills, nausea, vomiting, unexpected weight loss, hemoptysis/hematemesis/hematochezia/melena, orthopnea/PND/LE swelling.     ROS:  10 point ROS performed and negative other than stated above.      OBJECTIVE:     Vital signs:   /82 (BP Location: Left arm, Patient Position: Sitting, BP Method: X-Large (Automatic))   Pulse 71   Temp 97.8 °F (36.6 °C) (Oral)   Resp 18   Ht 5' 8" (1.727 m)   Wt 109.5 kg (241 lb 6.4 oz)   SpO2 99%   BMI 36.70 kg/m²      Physical Examination:  General: Well nourished w/o distress  HEENT: NC/AT; PERRLA; nasal and oral mucosa moist and clear; no sinus tenderness; no thyromegaly  Neck: Full ROM; no lymphadenopathy  Pulm: CTA bilaterally, normal work of breathing  CV: S1, S2 w/o murmurs or " "gallops; no edema noted  GI: Soft with normal bowel sounds in all quadrants, no masses on palpation  MSK: LLE knee fusion in extended position with inability to flex. B/L midline anterior knee scars present & well healed. Positive Tinel & Phalen Test (right wrist)  Derm: Multiple chronic lesions (b/l UE/LE) which are chronic/non-tender/non-pustular  Neuro: AAOx4; CN II-XII intact; 4/5 LUE motor, preserved motor throughout remaining motor/sensory function intact  Psych: Cooperative; appropriate mood and affect    ASSESSMENT & PLAN:   HTN - well controlled  - BP at goal (120/82)  - Continue Atenolol 25 mg daily, HCTZ-Lisinopril 25-20mg daily  - Recommended continuing DASH diet, pt is agreeable with plan     Osteoarthritis  Suspected Right Carpal Tunnel Syndrome   - B/L Knee Arthroplasty w/ multiple revisions   - Pt followed by ortho; s/p Left Knee Fusion (2/2022), recovering well   - Is able to ambulate with walker, mostly uses wheelchair at home   - Recommended nightly right wrist brace until further evaluation of carpal tunnel by orthpedics   - Continue to f/u with Ortho, appreciate recommendations    B/L UE/LE lesions; suspicious for keratosis pilaris  - Reports having lesions since childhood  - Previously followed by outside facility Dermatologist  - States previous prescription of "mixed" ointment that assisted with flares/pruritis/pain though was too expensive  - Will refer to [facility] for further evaluation     Hx of CVA  - Residual deficit of (left UE weakness)  - continue aspirin 81 daily  - Plavix discontinue review visit to provide mono antiplatelet therapy in setting of prior CVA with difficulty ambulating and fall risk; patient is agreeable with plan    Tobacco User  Chronic Bronchitis  Asthma/reactive airway disease   - Reduced from 1/2 ppd to x4-5 cigarettes daily since   - 30 pack year smoker, not interested in smoking cessation assistance at this time   - PFTs (8/2022) suspicious for mild " obstructive disease with 15% post-bronchodilator response   - continue rescue inhaler with albuterol p.r.n.   - prescribed Laba/ics maintenance inhaler (Breo Ellipta)   - Annual Low-Dose CT Thorax (benign w/ emphysematous changes)    HLD  - fasting lipid profile at goal  - Continue Pravastatin 20 mg daily         Health Maintenance:  Immunization History   Administered Date(s) Administered    COVID-19, MRNA, LN-S, PF (Pfizer) (Purple Cap) 02/24/2021, 03/17/2021, 10/20/2021    Pneumococcal Conjugate - 13 Valent 05/30/2022    Tdap 05/30/2022    Zoster Recombinant 08/25/2022, 01/09/2023   Colonoscopy  12/2016  -- Cologuard today (8/25/22)  Low-Dose CT Scan - Up to Date (8/2022)    Return to clinic in 6 months    Stuart Singer MD  [facility] Internal Medicine, PGY-2

## 2023-05-30 RX ORDER — FLUTICASONE PROPIONATE AND SALMETEROL 100; 50 UG/1; UG/1
1 POWDER RESPIRATORY (INHALATION) 2 TIMES DAILY
Qty: 60 EACH | Refills: 3 | Status: SHIPPED | OUTPATIENT
Start: 2023-05-30 | End: 2023-11-13 | Stop reason: SDUPTHER

## 2023-07-11 ENCOUNTER — TELEPHONE (OUTPATIENT)
Dept: ORTHOPEDICS | Facility: CLINIC | Age: 63
End: 2023-07-11
Payer: MEDICARE

## 2023-08-24 DIAGNOSIS — I10 HYPERTENSION, UNSPECIFIED TYPE: ICD-10-CM

## 2023-08-25 RX ORDER — ALBUTEROL SULFATE 90 UG/1
2 AEROSOL, METERED RESPIRATORY (INHALATION) EVERY 6 HOURS PRN
Qty: 6.7 G | Refills: 2 | Status: SHIPPED | OUTPATIENT
Start: 2023-08-25 | End: 2023-11-13 | Stop reason: SDUPTHER

## 2023-08-29 RX ORDER — AMLODIPINE BESYLATE 5 MG/1
5 TABLET ORAL DAILY
COMMUNITY
End: 2023-08-29 | Stop reason: SDUPTHER

## 2023-08-29 RX ORDER — AMLODIPINE BESYLATE 5 MG/1
5 TABLET ORAL DAILY
Qty: 30 TABLET | Refills: 6 | Status: SHIPPED | OUTPATIENT
Start: 2023-08-29 | End: 2023-11-13

## 2023-08-31 ENCOUNTER — HOSPITAL ENCOUNTER (OUTPATIENT)
Dept: RADIOLOGY | Facility: CLINIC | Age: 63
Discharge: HOME | End: 2023-08-31
Attending: ORTHOPAEDIC SURGERY
Payer: MEDICARE

## 2023-08-31 ENCOUNTER — OFFICE VISIT (OUTPATIENT)
Dept: ORTHOPEDICS | Facility: CLINIC | Age: 63
End: 2023-08-31
Payer: MEDICARE

## 2023-08-31 VITALS
HEART RATE: 81 BPM | WEIGHT: 246 LBS | TEMPERATURE: 97 F | HEIGHT: 68 IN | SYSTOLIC BLOOD PRESSURE: 118 MMHG | BODY MASS INDEX: 37.28 KG/M2 | DIASTOLIC BLOOD PRESSURE: 82 MMHG

## 2023-08-31 DIAGNOSIS — Z47.1 AFTERCARE FOLLOWING LEFT KNEE JOINT REPLACEMENT SURGERY: Primary | ICD-10-CM

## 2023-08-31 DIAGNOSIS — Z96.652 AFTERCARE FOLLOWING LEFT KNEE JOINT REPLACEMENT SURGERY: ICD-10-CM

## 2023-08-31 DIAGNOSIS — Z96.652 AFTERCARE FOLLOWING LEFT KNEE JOINT REPLACEMENT SURGERY: Primary | ICD-10-CM

## 2023-08-31 DIAGNOSIS — Z47.1 AFTERCARE FOLLOWING LEFT KNEE JOINT REPLACEMENT SURGERY: ICD-10-CM

## 2023-08-31 PROCEDURE — 1159F MED LIST DOCD IN RCRD: CPT | Mod: CPTII,,, | Performed by: ORTHOPAEDIC SURGERY

## 2023-08-31 PROCEDURE — 99213 PR OFFICE/OUTPT VISIT, EST, LEVL III, 20-29 MIN: ICD-10-PCS | Mod: ,,, | Performed by: ORTHOPAEDIC SURGERY

## 2023-08-31 PROCEDURE — 3008F PR BODY MASS INDEX (BMI) DOCUMENTED: ICD-10-PCS | Mod: CPTII,,, | Performed by: ORTHOPAEDIC SURGERY

## 2023-08-31 PROCEDURE — 99213 OFFICE O/P EST LOW 20 MIN: CPT | Mod: ,,, | Performed by: ORTHOPAEDIC SURGERY

## 2023-08-31 PROCEDURE — 3079F DIAST BP 80-89 MM HG: CPT | Mod: CPTII,,, | Performed by: ORTHOPAEDIC SURGERY

## 2023-08-31 PROCEDURE — 3074F PR MOST RECENT SYSTOLIC BLOOD PRESSURE < 130 MM HG: ICD-10-PCS | Mod: CPTII,,, | Performed by: ORTHOPAEDIC SURGERY

## 2023-08-31 PROCEDURE — 1159F PR MEDICATION LIST DOCUMENTED IN MEDICAL RECORD: ICD-10-PCS | Mod: CPTII,,, | Performed by: ORTHOPAEDIC SURGERY

## 2023-08-31 PROCEDURE — 4010F PR ACE/ARB THEARPY RXD/TAKEN: ICD-10-PCS | Mod: CPTII,,, | Performed by: ORTHOPAEDIC SURGERY

## 2023-08-31 PROCEDURE — 73560 XR KNEE 1 OR 2 VIEW LEFT: ICD-10-PCS | Mod: LT,,, | Performed by: ORTHOPAEDIC SURGERY

## 2023-08-31 PROCEDURE — 4010F ACE/ARB THERAPY RXD/TAKEN: CPT | Mod: CPTII,,, | Performed by: ORTHOPAEDIC SURGERY

## 2023-08-31 PROCEDURE — 73560 X-RAY EXAM OF KNEE 1 OR 2: CPT | Mod: LT,,, | Performed by: ORTHOPAEDIC SURGERY

## 2023-08-31 PROCEDURE — 3079F PR MOST RECENT DIASTOLIC BLOOD PRESSURE 80-89 MM HG: ICD-10-PCS | Mod: CPTII,,, | Performed by: ORTHOPAEDIC SURGERY

## 2023-08-31 PROCEDURE — 3074F SYST BP LT 130 MM HG: CPT | Mod: CPTII,,, | Performed by: ORTHOPAEDIC SURGERY

## 2023-08-31 PROCEDURE — 3008F BODY MASS INDEX DOCD: CPT | Mod: CPTII,,, | Performed by: ORTHOPAEDIC SURGERY

## 2023-08-31 NOTE — PROGRESS NOTES
Chief Complaint:   Chief Complaint   Patient presents with    Follow-up     f/u Lt TKA. patient states he feels pretty good. Sx 2/14/22       History of present illness:  62-year-old male presents follow-up after left knee fusion.  Overall he is doing well.  He does have some pain in his left tibia distally.  It is mild.  Denies any pain in his right knee.    Past Medical History:   Diagnosis Date    Essential (primary) hypertension     High cholesterol     Hyperlipidemia     Morbid obesity     Rheumatoid arthritis, unspecified     Stroke     Unspecified osteoarthritis, unspecified site        Past Surgical History:   Procedure Laterality Date    JOINT REPLACEMENT  02/07/2023    left total knee      right total knee      TOTAL KNEE ARTHROPLASTY Bilateral        Current Outpatient Medications   Medication Sig    albuterol (VENTOLIN HFA) 90 mcg/actuation inhaler Inhale 2 puffs into the lungs every 6 (six) hours as needed for Wheezing. Rescue    amLODIPine (NORVASC) 5 MG tablet Take 1 tablet (5 mg total) by mouth once daily.    aspirin (ECOTRIN) 81 MG EC tablet Take 1 tablet (81 mg total) by mouth once daily.    atenoloL (TENORMIN) 25 MG tablet Take 1 tablet (25 mg total) by mouth once daily.    cetirizine (ZYRTEC) 10 MG tablet Take 1 tablet (10 mg total) by mouth daily as needed.    cholecalciferol, vitamin D3, (VITAMIN D3) 50 mcg (2,000 unit) Cap capsule Take 1 capsule (2,000 Units total) by mouth once daily.    fluticasone-salmeterol diskus inhaler 100-50 mcg Inhale 1 puff into the lungs 2 (two) times daily. Controller    lisinopriL-hydrochlorothiazide (PRINZIDE,ZESTORETIC) 20-25 mg Tab Take 1 tablet by mouth once daily.    pravastatin (PRAVACHOL) 20 MG tablet Take 1 tablet (20 mg total) by mouth once daily.     No current facility-administered medications for this visit.       Review of patient's allergies indicates:   Allergen Reactions    Strawberry Swelling       Family History   Problem Relation Age of Onset     COPD Mother     Hypertension Father     COPD Father     Stroke Brother        Social History     Socioeconomic History    Marital status: Single   Tobacco Use    Smoking status: Every Day     Current packs/day: 0.50     Average packs/day: 0.5 packs/day for 46.0 years (23.0 ttl pk-yrs)     Types: Cigarettes     Start date: 8/16/1977    Smokeless tobacco: Never    Tobacco comments:     patient refused counseling   Substance and Sexual Activity    Alcohol use: Not Currently    Drug use: Not Currently     Types: Marijuana    Sexual activity: Not Currently     Social Determinants of Health     Financial Resource Strain: Low Risk  (5/30/2022)    Overall Financial Resource Strain (CARDIA)     Difficulty of Paying Living Expenses: Not hard at all   Food Insecurity: No Food Insecurity (5/30/2022)    Hunger Vital Sign     Worried About Running Out of Food in the Last Year: Never true     Ran Out of Food in the Last Year: Never true   Transportation Needs: No Transportation Needs (5/30/2022)    PRAPARE - Transportation     Lack of Transportation (Medical): No     Lack of Transportation (Non-Medical): No   Physical Activity: Inactive (5/30/2022)    Exercise Vital Sign     Days of Exercise per Week: 0 days     Minutes of Exercise per Session: 0 min   Stress: No Stress Concern Present (5/30/2022)    [employer] - Occupational Stress Questionnaire     Feeling of Stress : Not at all   Social Connections: Socially Isolated (5/30/2022)    Social Connection and Isolation Panel [NHANES]     Frequency of Communication with Friends and Family: More than three times a week     Frequency of Social Gatherings with Friends and Family: More than three times a week     Attends [employer] Services: Never     Active Member of Clubs or Organizations: No     Attends Club or Organization Meetings: Never     Marital Status: Never    Housing Stability: Low Risk  (5/30/2022)    Housing Stability Vital Sign      Unable to Pay for Housing in the Last Year: No     Number of Places Lived in the Last Year: 1     Unstable Housing in the Last Year: No           Review of Systems:    Constitution: Negative for chills, fever, and sweats.  Negative for unexplained weight loss.    HENT:  Negative for headaches and blurry vision.    Cardiovascular:Negative for chest pain or irregular heart beat. Negative for hypertension.    Respiratory:  Negative for cough and shortness of breath.    Gastrointestinal: Negative for abdominal pain, heartburn, melena, nausea, and vomitting.    Genitourinary:  Negative bladder incontinence and dysuria.    Musculoskeletal:  See HPI    Neurological: Negative for numbness.    Psychiatric/Behavioral: Negative for depression.  The patient is not nervous/anxious.      Endocrine: Negative for polyuria    Hematologic/Lymphatic: Negative for bleeding problem.  Does not bruise/bleed easily.    Skin: Negative for poor would healing and rash      Physical Examination:    Vital Signs:    Vitals:    08/31/23 1048   BP: 118/82   Pulse: 81   Temp: 97 °F (36.1 °C)       Body mass index is 37.4 kg/m².    General: No acute distress, alert and oriented, healthy appearing    HEENT: Head is atraumatic, mucous membranes are moist    Neck: Supples, no JVD    Cardiovascular: Palpable dorsalis pedis and posterior tibial pulses, regular rate and rhythm to those pulses    Lungs: Breathing non-labored    Skin: no rashes appreciated    Neurologic: Can flex and extend knees, ankles, and toes. Sensation is grossly intact    Left leg:  Patient's incisions are all clean and dry.  Brisk cap refill distally.  Sensation intact distally.  No tenderness over his distal interlock screws    X-rays:  Three views of his left leg were reviewed.  Patient's implants were in good position.  There is no signs of loosening subsidence.  His knee does not appear to be completely fused at the current time.     Assessment::  Status post left knee  fusion    Plan:  Patient is 2 years out.  He is having minimal complaints.  His knee does not appear to be completely use of the current time.  Given his lack of symptoms, he would like to hold off on further evaluation.  We will plan to see him back in 6 months with repeat x-rays of his left leg to evaluate his fusion mass.  If he continues to have signs of possible nonunion plan to get a CT scan of his left knee to evaluate for solid fusion    This note was created using [employer] voice recognition software that occasionally misinterpreted phrases or words.    Consult note is delivered via Epic messaging service.

## 2023-09-07 ENCOUNTER — PATIENT MESSAGE (OUTPATIENT)
Dept: RESEARCH | Facility: HOSPITAL | Age: 63
End: 2023-09-07
Payer: MEDICARE

## 2023-09-08 ENCOUNTER — TELEPHONE (OUTPATIENT)
Dept: ADMINISTRATIVE | Facility: HOSPITAL | Age: 63
End: 2023-09-08
Payer: MEDICARE

## 2023-09-14 NOTE — TELEPHONE ENCOUNTER
Spoke w/pt. He stated he gave us a DMV form in March to be filled out. Unable to locate form. Advised pt to obtain another form from DMV and drop it off next week. Provider has clinic next week. Pt verbalized understanding and will obtain form.

## 2023-10-17 DIAGNOSIS — I10 HYPERTENSION, UNSPECIFIED TYPE: ICD-10-CM

## 2023-10-18 RX ORDER — CETIRIZINE HYDROCHLORIDE 10 MG/1
10 TABLET ORAL DAILY PRN
Qty: 90 TABLET | Refills: 3 | Status: SHIPPED | OUTPATIENT
Start: 2023-10-18

## 2023-10-18 RX ORDER — PRAVASTATIN SODIUM 20 MG/1
20 TABLET ORAL DAILY
Qty: 90 TABLET | Refills: 3 | Status: SHIPPED | OUTPATIENT
Start: 2023-10-18 | End: 2024-01-11 | Stop reason: SDUPTHER

## 2023-11-13 ENCOUNTER — OFFICE VISIT (OUTPATIENT)
Dept: INTERNAL MEDICINE | Facility: CLINIC | Age: 63
End: 2023-11-13
Payer: MEDICARE

## 2023-11-13 VITALS
RESPIRATION RATE: 18 BRPM | OXYGEN SATURATION: 93 % | BODY MASS INDEX: 37.44 KG/M2 | DIASTOLIC BLOOD PRESSURE: 75 MMHG | HEART RATE: 74 BPM | SYSTOLIC BLOOD PRESSURE: 111 MMHG | TEMPERATURE: 98 F | WEIGHT: 247 LBS | HEIGHT: 68 IN

## 2023-11-13 DIAGNOSIS — R73.03 PREDIABETES: Primary | ICD-10-CM

## 2023-11-13 DIAGNOSIS — J32.9 CHRONIC SINUSITIS WITH RECURRENT BRONCHITIS: ICD-10-CM

## 2023-11-13 DIAGNOSIS — J32.1 CHRONIC FRONTAL SINUSITIS: ICD-10-CM

## 2023-11-13 DIAGNOSIS — J45.20 MILD INTERMITTENT REACTIVE AIRWAY DISEASE WITHOUT COMPLICATION: ICD-10-CM

## 2023-11-13 DIAGNOSIS — Z72.0 TOBACCO USE: ICD-10-CM

## 2023-11-13 DIAGNOSIS — I10 HYPERTENSION, UNSPECIFIED TYPE: ICD-10-CM

## 2023-11-13 DIAGNOSIS — J40 CHRONIC SINUSITIS WITH RECURRENT BRONCHITIS: ICD-10-CM

## 2023-11-13 LAB — HBA1C MFR BLD: 6.4 %

## 2023-11-13 PROCEDURE — 83036 HEMOGLOBIN GLYCOSYLATED A1C: CPT | Mod: PBBFAC | Performed by: STUDENT IN AN ORGANIZED HEALTH CARE EDUCATION/TRAINING PROGRAM

## 2023-11-13 PROCEDURE — 99214 OFFICE O/P EST MOD 30 MIN: CPT | Mod: PBBFAC | Performed by: STUDENT IN AN ORGANIZED HEALTH CARE EDUCATION/TRAINING PROGRAM

## 2023-11-13 RX ORDER — ALBUTEROL SULFATE 90 UG/1
2 AEROSOL, METERED RESPIRATORY (INHALATION) EVERY 6 HOURS PRN
Qty: 6.7 G | Refills: 2 | Status: SHIPPED | OUTPATIENT
Start: 2023-11-13 | End: 2024-11-12

## 2023-11-13 RX ORDER — FLUTICASONE PROPIONATE AND SALMETEROL 100; 50 UG/1; UG/1
1 POWDER RESPIRATORY (INHALATION) 2 TIMES DAILY
Qty: 60 EACH | Refills: 3 | Status: SHIPPED | OUTPATIENT
Start: 2023-11-13 | End: 2024-11-12

## 2023-11-13 NOTE — PROGRESS NOTES
"[facility] INTERNAL MEDICINE  OUTPATIENT OFFICE VISIT NOTE    SUBJECTIVE:      HPI: German Frank is a 63 y.o. yo male w/ PMH of PMH of CVA (w/ residual LUE weakness), HTN, HLD, Obesity, Tobacco Use, and Arthritis (B/L knee arthroplasty) who presents to [facility] for follow up visit.     Since last appointment, patient reports continued intermittent episodes of bronchitis/sinusitis with productive cough of clear sputum.  PFTs completed that time revealing obstructive pattern with 15% reversibility post-bronchodilator indicating asthma/reactive airway disease.  Trial of albuterol inhaler as patient is utilizing a proximally x2 occasions per day with relief.  Does utilize cetirizine and Mucinex with minimal improvement.  Lab significant for elevated eosinophils.  We will get IgE levels today and CT sinus    He states he used to follow with Dermatology many years ago in which a compound (oil/cream) was utilized to control his pruritus though is currently unsure of this compounds name.  Does have upcoming appointment in January with Dermatology.    He currently denies chest pain, shortness of breath, fever, chills, nausea, vomiting, unexpected weight loss, hemoptysis/hematemesis/hematochezia/melena, orthopnea/PND/LE swelling.     ROS:  10 point ROS performed and negative other than stated above.      Past Surgical History:   Procedure Laterality Date    JOINT REPLACEMENT  02/07/2023    left total knee      right total knee      TOTAL KNEE ARTHROPLASTY Bilateral            OBJECTIVE:     Vital signs:   /75 (BP Location: Right arm, Patient Position: Sitting, BP Method: Large (Automatic))   Pulse 74   Temp 97.9 °F (36.6 °C) (Oral)   Resp 18   Ht 5' 8" (1.727 m)   Wt 112 kg (247 lb)   SpO2 (!) 93%   BMI 37.56 kg/m²      Physical Examination:  General: Well nourished w/o distress  HEENT: NC/AT; PERRLA; sinus congestion; no sinus tenderness; no thyromegaly  Neck: Full ROM; no lymphadenopathy  Pulm: " "CTA bilaterally, normal work of breathing  CV: S1, S2 w/o murmurs or gallops; no edema noted  GI: Soft with normal bowel sounds in all quadrants, no masses on palpation  MSK: LLE knee fusion in extended position with inability to flex. B/L midline anterior knee scars present & well healed.  Derm: Multiple chronic lesions (b/l UE/LE) which are chronic/non-tender/non-pustular  Neuro: AAOx4; CN II-XII intact; 4/5 LUE motor, preserved motor throughout remaining motor/sensory function intact  Psych: Cooperative; appropriate mood and affect    ASSESSMENT & PLAN:   HTN - well controlled  - BP at goal (111/75)  - Continue Atenolol 25 mg daily, HCTZ-Lisinopril 25-20mg daily  - Recommended continuing DASH diet, pt is agreeable with plan     Osteoarthritis  Suspected Right Carpal Tunnel Syndrome   - B/L Knee Arthroplasty w/ multiple revisions   - Pt followed by ortho; s/p Left Knee Fusion (2/2022), recovering well   - Is able to ambulate with walker, mostly uses wheelchair at home   - Recommended nightly right wrist brace until further evaluation of carpal tunnel by orthpedics   - Continue to f/u with Ortho, appreciate recommendations    B/L UE/LE lesions; suspicious for keratosis pilaris - stable  - Reports having lesions since childhood  - Previously followed by outside facility Dermatologist  - States previous prescription of "mixed" ointment that assisted with flares/pruritis/pain though was too expensive     Hx of CVA  - Residual deficit of (left UE weakness)  - continue aspirin 81 daily  - Plavix discontinue review visit to provide mono antiplatelet therapy in setting of prior CVA with difficulty ambulating and fall risk; patient is agreeable with plan    Tobacco User  Chronic Bronchitis  Asthma/reactive airway disease   - Reduced from 1/2 ppd to x4-5 cigarettes daily since   - 30 pack year smoker, not interested in smoking cessation assistance at this time   - PFTs (8/2022) suspicious for mild obstructive disease with " 15% post-bronchodilator response   - continue rescue inhaler with albuterol p.r.n.   - prescribed Laba/ics maintenance inhaler (Breo Ellipta)   - Annual Low-Dose CT Thorax (benign w/ emphysematous changes) (8/2023) - unremarkable  - elevated eosinophils on recent labs and priors  - pending IgE level  - pending CT sinuses  - patient may benefit from biologic therapy with Xolair (omalizumab)    HLD  - fasting lipid profile at goal  - Continue Pravastatin 20 mg daily       Health Maintenance:  Immunization History   Administered Date(s) Administered    COVID-19, MRNA, LN-S, PF (Pfizer) (Purple Cap) 02/24/2021, 03/17/2021, 10/20/2021    Pneumococcal Conjugate - 13 Valent 05/30/2022    Tdap 05/30/2022    Zoster Recombinant 08/25/2022, 01/09/2023   Colonoscopy  12/2016  -- Cologuard negative (9/2022)  Low-Dose CT Scan - Up to Date (8/2023)    Return to clinic in 3 months    Stuart Singer MD  [facility] Internal Medicine, PGY-III

## 2023-11-13 NOTE — PROGRESS NOTES
Faculty addendum:     I have reviewed the patients history, residents  findings on physical examination, diagnosis and treatment plan. Care provided was reasonable and necessary.    Patient seen and examined this AM

## 2023-11-21 ENCOUNTER — HOSPITAL ENCOUNTER (OUTPATIENT)
Dept: RADIOLOGY | Facility: HOSPITAL | Age: 63
Discharge: HOME | End: 2023-11-21
Attending: STUDENT IN AN ORGANIZED HEALTH CARE EDUCATION/TRAINING PROGRAM
Payer: MEDICARE

## 2023-11-21 DIAGNOSIS — J32.1 CHRONIC FRONTAL SINUSITIS: ICD-10-CM

## 2023-11-21 DIAGNOSIS — J45.20 MILD INTERMITTENT REACTIVE AIRWAY DISEASE WITHOUT COMPLICATION: ICD-10-CM

## 2023-11-21 PROCEDURE — 70486 CT MAXILLOFACIAL W/O DYE: CPT | Mod: TC

## 2023-12-11 ENCOUNTER — TELEPHONE (OUTPATIENT)
Dept: INTERNAL MEDICINE | Facility: CLINIC | Age: 63
End: 2023-12-11
Payer: MEDICARE

## 2023-12-11 NOTE — TELEPHONE ENCOUNTER
Patient is looking for some paperwork from the DMV that he says he dropped off about 4 weeks ago. He is asking to pick it up as soon as possible.

## 2023-12-13 ENCOUNTER — TELEPHONE (OUTPATIENT)
Dept: INTERNAL MEDICINE | Facility: CLINIC | Age: 63
End: 2023-12-13
Payer: MEDICARE

## 2023-12-13 NOTE — TELEPHONE ENCOUNTER
Pt called and left vm concerning DMV Paperwork that was left for Dr Singer to fill out and sign. I spoke about this with Dr Singer and he informed me that he has the paperwork and will bring it to clinic tomorrow morning.   I informed pt of this and that he will get a call to come and  paperwork. Pt verbally states understanding.

## 2024-01-11 DIAGNOSIS — I10 HYPERTENSION, UNSPECIFIED TYPE: ICD-10-CM

## 2024-01-11 RX ORDER — PRAVASTATIN SODIUM 20 MG/1
20 TABLET ORAL DAILY
Qty: 90 TABLET | Refills: 3 | Status: SHIPPED | OUTPATIENT
Start: 2024-01-11

## 2024-01-11 RX ORDER — LISINOPRIL AND HYDROCHLOROTHIAZIDE 20; 25 MG/1; MG/1
1 TABLET ORAL DAILY
Qty: 90 TABLET | Refills: 3 | Status: SHIPPED | OUTPATIENT
Start: 2024-01-11

## 2024-01-11 RX ORDER — ATENOLOL 25 MG/1
25 TABLET ORAL DAILY
Qty: 90 TABLET | Refills: 3 | Status: SHIPPED | OUTPATIENT
Start: 2024-01-11

## 2024-01-26 ENCOUNTER — OFFICE VISIT (OUTPATIENT)
Dept: DERMATOLOGY | Facility: CLINIC | Age: 64
End: 2024-01-26
Payer: MEDICARE

## 2024-01-26 VITALS
SYSTOLIC BLOOD PRESSURE: 138 MMHG | DIASTOLIC BLOOD PRESSURE: 86 MMHG | OXYGEN SATURATION: 99 % | TEMPERATURE: 99 F | HEIGHT: 68 IN | BODY MASS INDEX: 36.37 KG/M2 | HEART RATE: 68 BPM | WEIGHT: 240 LBS | RESPIRATION RATE: 18 BRPM

## 2024-01-26 DIAGNOSIS — R21 RASH: Primary | ICD-10-CM

## 2024-01-26 DIAGNOSIS — L30.9 DERMATITIS: ICD-10-CM

## 2024-01-26 DIAGNOSIS — L85.8 KERATOSIS PILARIS: ICD-10-CM

## 2024-01-26 PROCEDURE — 99214 OFFICE O/P EST MOD 30 MIN: CPT | Mod: PBBFAC | Performed by: DERMATOLOGY

## 2024-01-26 PROCEDURE — 11102 TANGNTL BX SKIN SINGLE LES: CPT | Mod: PBBFAC | Performed by: STUDENT IN AN ORGANIZED HEALTH CARE EDUCATION/TRAINING PROGRAM

## 2024-01-26 PROCEDURE — 88342 IMHCHEM/IMCYTCHM 1ST ANTB: CPT | Performed by: DERMATOLOGY

## 2024-01-26 PROCEDURE — 88313 SPECIAL STAINS GROUP 2: CPT | Mod: TC | Performed by: DERMATOLOGY

## 2024-01-26 RX ORDER — LIDOCAINE HYDROCHLORIDE 10 MG/ML
5 INJECTION, SOLUTION EPIDURAL; INFILTRATION; INTRACAUDAL; PERINEURAL
Status: COMPLETED | OUTPATIENT
Start: 2024-01-26 | End: 2024-01-26

## 2024-01-26 RX ORDER — TRIAMCINOLONE ACETONIDE 1 MG/G
OINTMENT TOPICAL 2 TIMES DAILY PRN
Qty: 453.6 G | Refills: 5 | Status: SHIPPED | OUTPATIENT
Start: 2024-01-26

## 2024-01-26 RX ADMIN — LIDOCAINE HYDROCHLORIDE 50 MG: 10 INJECTION, SOLUTION EPIDURAL; INFILTRATION; INTRACAUDAL; PERINEURAL at 09:01

## 2024-01-26 NOTE — PROGRESS NOTES
"[facility] Medicine Clinic Visit    Chief Complaint:      Diffuse papular rash BLE, BUE, trunk x 5 years    Subjective:     HPI:  German Frank is a 63 y.o. male with  w/ PMH of PMH of CVA (w/ residual LUE weakness), HTN, HLD, Obesity, Tobacco Use, and Arthritis (B/L knee arthroplasty) here for dermatology referral for diffuse papular, pruritic rash on BLE, BUE, and trunk for the last 5 years. Patient reports treatment with Dr. Segundo about 2 years ago without resolution and rash has worsened. Pt was on compounded topical steroid that was ineffective as well as cost prohibitive, and is seeking treatment again. Currently uses lubriderm daily and has used Cerave which helped with pruritus, though did not resolve rash. He states he had this as a child but it self resolved until about 5 years ago it recurred.      Review of Systems  12 point ROS negative unless mentioned in chart    Objective:   Last 24 Hour Vital Signs:  Vitals  BP: 138/86  Temp: 98.6 °F (37 °C)  Pulse: 68  Resp: 18  SpO2: 99 %  Height: 5' 7.72" (172 cm)  Weight: 108.9 kg (240 lb)    Physical Examination:    General: Well nourished, well developed in NAD  HEENT: PERRLA, NC/AT, MMM  Respiratory: CTAB, normal respiratory effort  Cardiovascular: RRR, no m/r/g  Gastrointestinal: S, NT, ND, active BS, no masses palpable  Musculoskeletal: full range of motion of all extremities/spine without limitation or discomfort  Integumentary: diffuse papular hyperpigmented, pruritic rash; see pictures below              Labs  BMP:   Lab Results   Component Value Date    CHLORIDE 102 05/29/2023    CO2 27 05/29/2023    BUN 10.2 05/29/2023    CREATININE 1.28 (H) 05/29/2023    GLUCOSE 92 05/29/2023    CALCIUM 9.6 05/29/2023     CBC:   Lab Results   Component Value Date    WBC 8.79 05/29/2023    HGB 14.1 05/29/2023    HCT 44.6 05/29/2023    MCV 82.1 05/29/2023    RDW 16.6 05/29/2023     LFTs:   Lab Results   Component Value Date    LABPROT 7.2 05/29/2023    ALBUMIN " "3.9 05/29/2023    AST 15 05/29/2023    ALT 17 05/29/2023    ALKPHOS 83 05/29/2023     FLP:   Lab Results   Component Value Date    CHOL 136 05/26/2022    HDL 42 05/26/2022    LDL 80.00 05/26/2022    TRIG 69 05/26/2022    TOTALCHOLEST 3 05/26/2022     DM:   Lab Results   Component Value Date    HGBA1C 5.5 08/23/2022    .2 08/23/2022    CREATININE 1.28 (H) 05/29/2023    CREATRANDUR 135.8 01/03/2023    PROTEINURINE <6.8 01/03/2023     Thyroid:   Lab Results   Component Value Date    TSH 2.2380 05/26/2022    TTYGDB1NFVG 0.90 01/05/2022     Anemia: No results found for: "IRON", "TIBC", "FERRITIN", "[id number]", "FOLATE"    Procedure: shave biopsy of papule to left leg. Pt tolerated well, gauze placed and pressure held, scant bleeding controlled, band-aid in place CDI. Written consent obtained prior to procedure.      Assessment & Plan:     Diffuse Dermatitis  -Ddx: pityriasis rubra pilaris vs prurigo nodularis vs lichen myxedematosus  -shave biopsy done today sent to pathology  -Will order large jar of 0.1% triamcinolone ointment to apply BID PRN to affected areas, 5 refills ordered  -RTC in 4 weeks to go over biopsy results and observe treatment response      Follow-ups  -Follow-up dermatology clinic in 4 weeks      Gautam Pleitez MD  [employer] IM PGY III      "

## 2024-02-07 LAB
DHEA SERPL-MCNC: NORMAL
ESTROGEN SERPL-MCNC: NORMAL PG/ML
INSULIN SERPL-ACNC: NORMAL U[IU]/ML
LAB AP CLINICAL INFORMATION: NORMAL
LAB AP GROSS DESCRIPTION: NORMAL
LAB AP REPORT FOOTNOTES: NORMAL
T3RU NFR SERPL: NORMAL %

## 2024-02-15 LAB — BEAKER SEE SCANNED REPORT: NORMAL

## 2024-02-29 ENCOUNTER — OFFICE VISIT (OUTPATIENT)
Dept: ORTHOPEDICS | Facility: CLINIC | Age: 64
End: 2024-02-29
Payer: MEDICARE

## 2024-02-29 ENCOUNTER — HOSPITAL ENCOUNTER (OUTPATIENT)
Dept: RADIOLOGY | Facility: CLINIC | Age: 64
Discharge: HOME | End: 2024-02-29
Attending: ORTHOPAEDIC SURGERY
Payer: MEDICARE

## 2024-02-29 VITALS
HEIGHT: 67 IN | BODY MASS INDEX: 37.67 KG/M2 | HEART RATE: 76 BPM | DIASTOLIC BLOOD PRESSURE: 81 MMHG | WEIGHT: 240 LBS | SYSTOLIC BLOOD PRESSURE: 110 MMHG

## 2024-02-29 DIAGNOSIS — Z47.1 AFTERCARE FOLLOWING LEFT KNEE JOINT REPLACEMENT SURGERY: ICD-10-CM

## 2024-02-29 DIAGNOSIS — Z96.652 AFTERCARE FOLLOWING LEFT KNEE JOINT REPLACEMENT SURGERY: Primary | ICD-10-CM

## 2024-02-29 DIAGNOSIS — Z96.652 AFTERCARE FOLLOWING LEFT KNEE JOINT REPLACEMENT SURGERY: ICD-10-CM

## 2024-02-29 DIAGNOSIS — Z47.1 AFTERCARE FOLLOWING LEFT KNEE JOINT REPLACEMENT SURGERY: Primary | ICD-10-CM

## 2024-02-29 PROCEDURE — 4010F ACE/ARB THERAPY RXD/TAKEN: CPT | Mod: CPTII,,, | Performed by: ORTHOPAEDIC SURGERY

## 2024-02-29 PROCEDURE — 3074F SYST BP LT 130 MM HG: CPT | Mod: CPTII,,, | Performed by: ORTHOPAEDIC SURGERY

## 2024-02-29 PROCEDURE — 3079F DIAST BP 80-89 MM HG: CPT | Mod: CPTII,,, | Performed by: ORTHOPAEDIC SURGERY

## 2024-02-29 PROCEDURE — 1159F MED LIST DOCD IN RCRD: CPT | Mod: CPTII,,, | Performed by: ORTHOPAEDIC SURGERY

## 2024-02-29 PROCEDURE — 73560 X-RAY EXAM OF KNEE 1 OR 2: CPT | Mod: LT,,, | Performed by: ORTHOPAEDIC SURGERY

## 2024-02-29 PROCEDURE — 3008F BODY MASS INDEX DOCD: CPT | Mod: CPTII,,, | Performed by: ORTHOPAEDIC SURGERY

## 2024-02-29 PROCEDURE — 99213 OFFICE O/P EST LOW 20 MIN: CPT | Mod: ,,, | Performed by: ORTHOPAEDIC SURGERY

## 2024-02-29 NOTE — PROGRESS NOTES
Chief Complaint:   Chief Complaint   Patient presents with    Left Knee - Follow-up     Lt TKA Sx 2/14/22 Patient states he feels ok but he does have some pain. He use his wheelchair at home but here today with walker.       History of present illness:  63-year-old male presents today for evaluation of left knee fusion.  Overall he is doing fairly well.  He is having some pain on occasion.  Not severe.  Happy with his recovery.    Past Medical History:   Diagnosis Date    Essential (primary) hypertension     High cholesterol     Hyperlipidemia     Morbid obesity     Rheumatoid arthritis, unspecified     Stroke     Unspecified osteoarthritis, unspecified site        Past Surgical History:   Procedure Laterality Date    JOINT REPLACEMENT  02/07/2023    left total knee      right total knee      TOTAL KNEE ARTHROPLASTY Bilateral        Current Outpatient Medications   Medication Sig    albuterol (VENTOLIN HFA) 90 mcg/actuation inhaler Inhale 2 puffs into the lungs every 6 (six) hours as needed for Wheezing. Rescue    aspirin (ECOTRIN) 81 MG EC tablet Take 1 tablet (81 mg total) by mouth once daily.    atenoloL (TENORMIN) 25 MG tablet Take 1 tablet (25 mg total) by mouth once daily.    cetirizine (ZYRTEC) 10 MG tablet Take 1 tablet (10 mg total) by mouth daily as needed.    cholecalciferol, vitamin D3, (VITAMIN D3) 50 mcg (2,000 unit) Cap capsule Take 1 capsule (2,000 Units total) by mouth once daily.    fluticasone-salmeterol diskus inhaler 100-50 mcg Inhale 1 puff into the lungs 2 (two) times daily. Controller    lisinopriL-hydrochlorothiazide (PRINZIDE,ZESTORETIC) 20-25 mg Tab Take 1 tablet by mouth once daily.    pravastatin (PRAVACHOL) 20 MG tablet Take 1 tablet (20 mg total) by mouth once daily.    triamcinolone acetonide 0.1% (KENALOG) 0.1 % ointment Apply topically 2 (two) times daily as needed (apply topically to rash).     No current facility-administered medications for this visit.       Review of patient's  allergies indicates:   Allergen Reactions    Strawberry Swelling       Family History   Problem Relation Age of Onset    COPD Mother     Hypertension Father     COPD Father     Stroke Brother        Social History     Socioeconomic History    Marital status: Single   Tobacco Use    Smoking status: Every Day     Current packs/day: 0.50     Average packs/day: 0.5 packs/day for 46.5 years (23.3 ttl pk-yrs)     Types: Cigarettes     Start date: 8/16/1977    Smokeless tobacco: Never    Tobacco comments:     patient refused counseling   Substance and Sexual Activity    Alcohol use: Not Currently    Drug use: Not Currently     Types: Marijuana    Sexual activity: Not Currently     Social Determinants of Health     Financial Resource Strain: Medium Risk (11/13/2023)    Overall Financial Resource Strain (CARDIA)     Difficulty of Paying Living Expenses: Somewhat hard   Food Insecurity: No Food Insecurity (11/13/2023)    Hunger Vital Sign     Worried About Running Out of Food in the Last Year: Never true     Ran Out of Food in the Last Year: Never true   Transportation Needs: No Transportation Needs (11/13/2023)    PRAPARE - Transportation     Lack of Transportation (Medical): No     Lack of Transportation (Non-Medical): No   Physical Activity: Inactive (11/13/2023)    Exercise Vital Sign     Days of Exercise per Week: 0 days     Minutes of Exercise per Session: 0 min   Stress: No Stress Concern Present (11/13/2023)    Citizen of [place] of Occupational Health - Occupational Stress Questionnaire     Feeling of Stress : Not at all   Social Connections: Socially Isolated (11/13/2023)    Social Connection and Isolation Panel [NHANES]     Frequency of Communication with Friends and Family: More than three times a week     Frequency of Social Gatherings with Friends and Family: Once a week     Attends Rastafarian Services: Never     Active Member of Clubs or Organizations: No     Attends Club or Organization Meetings: Never      Marital Status: Never    Housing Stability: Low Risk  (11/13/2023)    Housing Stability Vital Sign     Unable to Pay for Housing in the Last Year: No     Number of Places Lived in the Last Year: 1     Unstable Housing in the Last Year: No           Review of Systems:    Constitution: Negative for chills, fever, and sweats.  Negative for unexplained weight loss.    HENT:  Negative for headaches and blurry vision.    Cardiovascular:Negative for chest pain or irregular heart beat. Negative for hypertension.    Respiratory:  Negative for cough and shortness of breath.    Gastrointestinal: Negative for abdominal pain, heartburn, melena, nausea, and vomitting.    Genitourinary:  Negative bladder incontinence and dysuria.    Musculoskeletal:  See HPI    Neurological: Negative for numbness.    Psychiatric/Behavioral: Negative for depression.  The patient is not nervous/anxious.      Endocrine: Negative for polyuria    Hematologic/Lymphatic: Negative for bleeding problem.  Does not bruise/bleed easily.    Skin: Negative for poor would healing and rash      Physical Examination:    Vital Signs:    Vitals:    02/29/24 0839   BP: 110/81   Pulse: 76       Body mass index is 37.59 kg/m².    General: No acute distress, alert and oriented, healthy appearing    HEENT: Head is atraumatic, mucous membranes are moist    Neck: Supples, no JVD    Cardiovascular: Palpable dorsalis pedis and posterior tibial pulses, regular rate and rhythm to those pulses    Lungs: Breathing non-labored    Skin: no rashes appreciated    Neurologic: Can flex and extend knees, ankles, and toes. Sensation is grossly intact    Left knee:  No range of motion.  Brisk cap refill disappeared sensation intact distally.  Neurovascularly intact to foot.    X-rays:  Three views of the left knee reviewed.  Patient's implants well fixed.  It appears to be solidly fused.  No signs of loss of reduction or hardware loosening     Assessment::  Status post left knee  fusion    Plan:  No signs of loosening.  Overall patient is doing fairly well.  Plan to see him back in a year with repeat x-rays of his left knee.    This note was created using [employer] voice recognition software that occasionally misinterpreted phrases or words.    Consult note is delivered via Epic messaging service.

## 2024-03-04 ENCOUNTER — OFFICE VISIT (OUTPATIENT)
Dept: INTERNAL MEDICINE | Facility: CLINIC | Age: 64
End: 2024-03-04
Payer: MEDICARE

## 2024-03-04 VITALS
RESPIRATION RATE: 18 BRPM | TEMPERATURE: 97 F | WEIGHT: 240.81 LBS | BODY MASS INDEX: 37.8 KG/M2 | DIASTOLIC BLOOD PRESSURE: 80 MMHG | SYSTOLIC BLOOD PRESSURE: 117 MMHG | HEIGHT: 67 IN | HEART RATE: 80 BPM | OXYGEN SATURATION: 95 %

## 2024-03-04 DIAGNOSIS — G56.00 CARPAL TUNNEL SYNDROME, UNSPECIFIED LATERALITY: ICD-10-CM

## 2024-03-04 DIAGNOSIS — E55.9 VITAMIN D DEFICIENCY: ICD-10-CM

## 2024-03-04 DIAGNOSIS — R73.03 PRE-DIABETES: Primary | ICD-10-CM

## 2024-03-04 DIAGNOSIS — R79.9 ABNORMAL FINDING OF BLOOD CHEMISTRY, UNSPECIFIED: ICD-10-CM

## 2024-03-04 DIAGNOSIS — Z00.00 HEALTHCARE MAINTENANCE: ICD-10-CM

## 2024-03-04 LAB — HBA1C MFR BLD: 6.6 %

## 2024-03-04 PROCEDURE — 83036 HEMOGLOBIN GLYCOSYLATED A1C: CPT | Mod: PBBFAC | Performed by: STUDENT IN AN ORGANIZED HEALTH CARE EDUCATION/TRAINING PROGRAM

## 2024-03-04 PROCEDURE — 99214 OFFICE O/P EST MOD 30 MIN: CPT | Mod: PBBFAC | Performed by: STUDENT IN AN ORGANIZED HEALTH CARE EDUCATION/TRAINING PROGRAM

## 2024-03-04 NOTE — PROGRESS NOTES
I have reviewed and concur with the resident's history, physical, assessment, and plan.  I have discussed with him all issues related to the diagnosis, workup and treatment plan. Care provided as reasonable and necessary.  Asthamticbronchtis -on Blanquita Rossi MD  [facility]

## 2024-03-04 NOTE — PROGRESS NOTES
[facility] INTERNAL MEDICINE  OUTPATIENT OFFICE VISIT NOTE    SUBJECTIVE:      HPI: German Frank is a 63 y.o. yo male w/ PMH of PMH of CVA (w/ residual LUE weakness), HTN, HLD, Obesity, Tobacco Use, and Arthritis (B/L knee arthroplasty) who presents to [facility] IM Medicine Clinic for follow up visit.     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  Shave biopsy also completed at this appointment further evaluation via pathology.  Patient states since applying triamcinolone ointment to affected areas pruritus has greatly improved.  Patient has upcoming Dermatology appointment (03/15/2024) discuss biopsy results and further treatment plan.    Since last appointment, patient reports continued unfortunate intermittent episodes of bronchitis/sinusitis with productive cough of clear sputum.  PFTs completed previously revealed obstructive pattern with 15% reversibility post-bronchodilator indicating asthma/reactive airway disease.  He reports utilizing Advair as prescribed and has since been able to decrease albuterol inhaler used to x2 occasions per week. Does utilize cetirizine and Mucinex with minimal improvement.  Lab significant for elevated eosinophils.  Pending IgE levels.    He currently denies chest pain, shortness of breath, fever, chills, nausea, vomiting, unexpected weight loss, hemoptysis/hematemesis/hematochezia/melena, orthopnea/PND/LE swelling.     ROS:  10 point ROS performed and negative other than stated above.      Past Surgical History:   Procedure Laterality Date    JOINT REPLACEMENT  02/07/2023    left total knee      right total knee      TOTAL KNEE ARTHROPLASTY Bilateral            OBJECTIVE:     Vital signs:   /80 (BP Location: Right arm, Patient Position: Sitting, BP Method: Large (Automatic))   Pulse 80   Temp 97.3 °F (36.3  "°C) (Oral)   Resp 18   Ht 5' 7" (1.702 m)   Wt 109.2 kg (240 lb 12.8 oz)   SpO2 95%   BMI 37.71 kg/m²      Physical Examination:  General: Well nourished w/o distress  HEENT: NC/AT; PERRLA; sinus congestion; no sinus tenderness; no thyromegaly  Neck: Full ROM; no lymphadenopathy  Pulm: CTA bilaterally, normal work of breathing  CV: S1, S2 w/o murmurs or gallops; no edema noted  GI: Soft with normal bowel sounds in all quadrants, no masses on palpation  MSK: LLE knee fusion in extended position with inability to flex. B/L midline anterior knee scars present & well healed.  Right wrist/hand positive Phalen's/Tinel's test  Derm: Multiple chronic lesions (b/l UE/LE) which are chronic/non-tender/non-pustular  Neuro: AAOx4; CN II-XII intact; 4/5 LUE motor, preserved motor throughout remaining motor/sensory function intact  Psych: Cooperative; appropriate mood and affect    ASSESSMENT & PLAN:   HTN - well controlled  - BP at goal (117/80)  - Continue Atenolol 25 mg daily, HCTZ-Lisinopril 25-20mg daily  - Recommended continuing DASH diet, pt is agreeable with plan     Osteoarthritis  Suspected Right Carpal Tunnel Syndrome   - B/L Knee Arthroplasty w/ multiple revisions   - Pt followed by ortho; s/p Left Knee Fusion (2/2022), recovering well   - Is able to ambulate with walker, mostly uses wheelchair at home   - Recommended nightly right wrist brace until further evaluation of carpal tunnel by orthpedics  - we will refer to [facility] for further evaluation of suspected right carpal tunnel syndrome for possible conservative management    B/L UE/LE lesions; suspicious for keratosis pilaris - stable  - Reports having lesions since childhood  - Previously followed by outside facility Dermatologist  - States previous prescription of "mixed" ointment that assisted with flares/pruritis/pain though was too expensive     Hx of CVA  - Residual deficit of (left UE weakness)  - continue aspirin 81 daily  - Plavix discontinue " review visit to provide mono antiplatelet therapy in setting of prior CVA with difficulty ambulating and fall risk; patient is agreeable with plan    Type II Diabetes Mellitus  - POC A1c today in clinic (6.6)  - patient reports eating sweets on frequent basis; counseled regarding limiting such ingestion in addition to opting for diet/zero options for sodas in which he is amenable  - unfortunately due to patient's disability and inability to exercise; we discussed above-mentioned dietary changes and repeat of A1c at next clinic appointment in x3 months  - we also discussed that if A1c does not downtrend and/or worsens we will likely need to initiate metformin therapy    Tobacco User  Chronic Bronchitis  Asthma/reactive airway disease   - Reduced from 1/2 ppd to x4-5 cigarettes daily since   - 30 pack year smoker, not interested in smoking cessation assistance at this time   - PFTs (8/2022) suspicious for mild obstructive disease with 15% post-bronchodilator response   - continue rescue inhaler with albuterol p.r.n.   - prescribed Laba/ics maintenance inhaler (Breo Ellipta)   - Annual Low-Dose CT Thorax (benign w/ emphysematous changes) (8/2023) - unremarkable  - elevated eosinophils on recent labs and priors  - pending IgE level    HLD  - fasting lipid profile at goal  - Continue Pravastatin 20 mg daily       Health Maintenance:  Immunization History   Administered Date(s) Administered    COVID-19, MRNA, LN-S, PF (Pfizer) (Purple Cap) 02/24/2021, 03/17/2021, 10/20/2021    Pneumococcal Conjugate - 13 Valent 05/30/2022    Tdap 05/30/2022    Zoster Recombinant 08/25/2022, 01/09/2023   Colonoscopy  12/2016  -- Cologuard negative (9/2022)  Low-Dose CT Scan - Up to Date (8/2023)    Return to clinic in 3 months    Stuart Singer MD  [facility], PGY-III  Answers submitted by the patient for this visit:  Review of Systems Questionnaire (Submitted on 3/4/2024)  activity change: No  unexpected weight change:  No  neck pain: No  hearing loss: No  rhinorrhea: Yes  trouble swallowing: No  eye discharge: No  visual disturbance: No  chest tightness: No  wheezing: No  chest pain: No  palpitations: No  blood in stool: No  constipation: Yes  vomiting: No  diarrhea: No  polydipsia: No  polyuria: No  difficulty urinating: No  urgency: No  hematuria: No  joint swelling: No  arthralgias: No  headaches: No  weakness: No  confusion: No  dysphoric mood: No

## 2024-03-15 ENCOUNTER — OFFICE VISIT (OUTPATIENT)
Dept: DERMATOLOGY | Facility: CLINIC | Age: 64
End: 2024-03-15
Payer: MEDICARE

## 2024-03-15 VITALS
HEART RATE: 77 BPM | WEIGHT: 239 LBS | BODY MASS INDEX: 36.22 KG/M2 | HEIGHT: 68 IN | SYSTOLIC BLOOD PRESSURE: 111 MMHG | DIASTOLIC BLOOD PRESSURE: 75 MMHG | TEMPERATURE: 97 F | OXYGEN SATURATION: 98 % | RESPIRATION RATE: 20 BRPM

## 2024-03-15 DIAGNOSIS — L99 LICHEN AMYLOIDOSIS: Primary | ICD-10-CM

## 2024-03-15 DIAGNOSIS — E85.4 LICHEN AMYLOIDOSIS: Primary | ICD-10-CM

## 2024-03-15 DIAGNOSIS — G93.39 OTHER POST INFECTION AND RELATED FATIGUE SYNDROMES: ICD-10-CM

## 2024-03-15 PROCEDURE — 99214 OFFICE O/P EST MOD 30 MIN: CPT | Mod: PBBFAC | Performed by: DERMATOLOGY

## 2024-03-15 NOTE — PROGRESS NOTES
"[facility] OFFICE VISIT NOTE  German Frank  [id number]  03/15/2024    Chief Complaint   Patient presents with    Follow-up     Rash, generalized areas       German Frank is a 63 y.o. male with a PMH of CVA (w/ residual LUE weakness)  presenting to [facility] Derm clinic for follow up papular/pruritic rash located on BLE/BUE and trunk. Pt was seen in 1/2024 and was started on triamcinolone ointment. He has also been using cerave soap and reports improvement in skin itchiness. Shave biopsy completed prior visit revealed lichen/macular amyloidosis. He denies any other symptoms      Review of Systems  See HPI    Blood pressure 111/75, pulse 77, temperature 97.4 °F (36.3 °C), temperature source Oral, resp. rate 20, height 5' 8" (1.727 m), weight 108.4 kg (239 lb), SpO2 98 %.   Physical Exam  Gen: in no acute distress  Resp: breathing normally  Skin: diffuse papular hyperpigmented, pruritic rash; see pic below             Current Medications:   Current Outpatient Medications   Medication Sig Dispense Refill    albuterol (VENTOLIN HFA) 90 mcg/actuation inhaler Inhale 2 puffs into the lungs every 6 (six) hours as needed for Wheezing. Rescue 6.7 g 2    aspirin (ECOTRIN) 81 MG EC tablet Take 1 tablet (81 mg total) by mouth once daily. 90 tablet 3    atenoloL (TENORMIN) 25 MG tablet Take 1 tablet (25 mg total) by mouth once daily. 90 tablet 3    cetirizine (ZYRTEC) 10 MG tablet Take 1 tablet (10 mg total) by mouth daily as needed. 90 tablet 3    cholecalciferol, vitamin D3, (VITAMIN D3) 50 mcg (2,000 unit) Cap capsule Take 1 capsule (2,000 Units total) by mouth once daily. 30 capsule 3    fluticasone-salmeterol diskus inhaler 100-50 mcg Inhale 1 puff into the lungs 2 (two) times daily. Controller 60 each 3    lisinopriL-hydrochlorothiazide (PRINZIDE,ZESTORETIC) 20-25 mg Tab Take 1 tablet by mouth once daily. 90 tablet 3    pravastatin (PRAVACHOL) 20 MG tablet Take 1 tablet (20 mg total) by mouth once daily. 90 tablet 3    " triamcinolone acetonide 0.1% (KENALOG) 0.1 % ointment Apply topically 2 (two) times daily as needed (apply topically to rash). 453.6 g 5     No current facility-administered medications for this visit.       Assessment:   1. Lichen amyloidosis          Plan:  Ddx:Lichen amyloidosis cutaneous form of amyloidosis 2/2 scratching   -Etiology of scratching likely multifactorial-->increased creatinine vs medications vs neuropathic 2/2 hx of CVA (will consider gabapentin)  -HIV, Hep panel, and spep ordered  -Continue triamcinolone ointment BID and OTC Cerave      Orders Placed This Encounter    HIV 1/2 Ag/Ab (4th Gen)    Hepatitis Panel, Acute    Immunofixation & Protein Electrophoresis & Immunoglobulins   Could consider gabapentin, did have a stroke in the past; could be neuropathic     Return to clinic in 4 months    Dmitry Cosby  [facility] FM Resident

## 2024-06-24 ENCOUNTER — OFFICE VISIT (OUTPATIENT)
Dept: INTERNAL MEDICINE | Facility: CLINIC | Age: 64
End: 2024-06-24
Payer: MEDICARE

## 2024-06-24 VITALS
HEART RATE: 80 BPM | TEMPERATURE: 98 F | SYSTOLIC BLOOD PRESSURE: 114 MMHG | WEIGHT: 236.19 LBS | OXYGEN SATURATION: 96 % | DIASTOLIC BLOOD PRESSURE: 78 MMHG | HEIGHT: 68 IN | BODY MASS INDEX: 35.79 KG/M2 | RESPIRATION RATE: 18 BRPM

## 2024-06-24 DIAGNOSIS — Z72.0 TOBACCO ABUSE: ICD-10-CM

## 2024-06-24 DIAGNOSIS — R73.03 PRE-DIABETES: Primary | ICD-10-CM

## 2024-06-24 DIAGNOSIS — I10 HYPERTENSION, UNSPECIFIED TYPE: ICD-10-CM

## 2024-06-24 PROCEDURE — 99214 OFFICE O/P EST MOD 30 MIN: CPT | Mod: PBBFAC | Performed by: STUDENT IN AN ORGANIZED HEALTH CARE EDUCATION/TRAINING PROGRAM

## 2024-06-24 RX ORDER — ALBUTEROL SULFATE 90 UG/1
2 AEROSOL, METERED RESPIRATORY (INHALATION) EVERY 6 HOURS PRN
Qty: 6.7 G | Refills: 2 | Status: SHIPPED | OUTPATIENT
Start: 2024-06-24 | End: 2025-06-24

## 2024-06-24 RX ORDER — CETIRIZINE HYDROCHLORIDE 10 MG/1
10 TABLET ORAL DAILY PRN
Qty: 90 TABLET | Refills: 3 | Status: SHIPPED | OUTPATIENT
Start: 2024-06-24

## 2024-06-24 RX ORDER — ASPIRIN 81 MG/1
81 TABLET ORAL DAILY
Qty: 90 TABLET | Refills: 3 | Status: SHIPPED | OUTPATIENT
Start: 2024-06-24

## 2024-06-24 RX ORDER — LISINOPRIL AND HYDROCHLOROTHIAZIDE 20; 25 MG/1; MG/1
1 TABLET ORAL DAILY
Qty: 90 TABLET | Refills: 3 | Status: SHIPPED | OUTPATIENT
Start: 2024-06-24

## 2024-06-24 RX ORDER — PRAVASTATIN SODIUM 20 MG/1
20 TABLET ORAL DAILY
Qty: 90 TABLET | Refills: 3 | Status: SHIPPED | OUTPATIENT
Start: 2024-06-24

## 2024-06-24 RX ORDER — ACETAMINOPHEN 500 MG
2000 TABLET ORAL DAILY
Qty: 30 CAPSULE | Refills: 3 | Status: SHIPPED | OUTPATIENT
Start: 2024-06-24

## 2024-06-24 RX ORDER — TIOTROPIUM BROMIDE 18 UG/1
18 CAPSULE ORAL; RESPIRATORY (INHALATION) DAILY
Qty: 30 CAPSULE | Refills: 3 | Status: SHIPPED | OUTPATIENT
Start: 2024-06-24 | End: 2025-06-24

## 2024-06-24 RX ORDER — FLUTICASONE PROPIONATE AND SALMETEROL 100; 50 UG/1; UG/1
1 POWDER RESPIRATORY (INHALATION) 2 TIMES DAILY
Qty: 60 EACH | Refills: 3 | Status: SHIPPED | OUTPATIENT
Start: 2024-06-24 | End: 2025-06-24

## 2024-06-24 RX ORDER — ATENOLOL 25 MG/1
25 TABLET ORAL DAILY
Qty: 90 TABLET | Refills: 3 | Status: SHIPPED | OUTPATIENT
Start: 2024-06-24

## 2024-06-24 NOTE — PROGRESS NOTES
"[facility] INTERNAL MEDICINE  OUTPATIENT OFFICE VISIT NOTE    SUBJECTIVE:      HPI: German Frank is a 63 y.o. yo male w/ PMH of PMH of CVA (w/ residual LUE weakness), HTN, HLD, Obesity, Tobacco Use, and Arthritis (B/L knee arthroplasty) who presents to [facility] for follow up visit.     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  Shave biopsy also completed at this appointment further evaluation via pathology.  Patient states since applying triamcinolone ointment to affected areas pruritus has greatly improved.  Patient has upcoming Dermatology appointment (7/12/24) for further management.    He currently denies chest pain, shortness of breath, fever, chills, nausea, vomiting, unexpected weight loss, hemoptysis/hematemesis/hematochezia/melena, orthopnea/PND/LE swelling.     ROS:  10 point ROS performed and negative other than stated above.      Past Surgical History:   Procedure Laterality Date    JOINT REPLACEMENT  02/07/2023    left total knee      right total knee      TOTAL KNEE ARTHROPLASTY Bilateral            OBJECTIVE:     Vital signs:   /78 (BP Location: Left arm, Patient Position: Sitting, BP Method: Large (Automatic))   Pulse 80   Temp 97.9 °F (36.6 °C) (Oral)   Resp 18   Ht 5' 8" (1.727 m)   Wt 107.1 kg (236 lb 3.2 oz)   SpO2 96%   BMI 35.91 kg/m²      Physical Examination:  General: Well nourished w/o distress  HEENT: NC/AT; PERRLA; sinus congestion; no sinus tenderness; no thyromegaly  Neck: Full ROM; no lymphadenopathy  Pulm: CTA bilaterally, normal work of breathing  CV: S1, S2 w/o murmurs or gallops; no edema noted  GI: Soft with normal bowel sounds in all quadrants, no masses on palpation  MSK: LLE knee fusion in extended position with inability to flex. B/L midline anterior knee scars present & well " "healed.  Right wrist/hand positive Phalen's/Tinel's test  Derm: Multiple chronic lesions (b/l UE/LE) which are chronic/non-tender/non-pustular  Neuro: AAOx4; CN II-XII intact; 4/5 LUE motor, preserved motor throughout remaining motor/sensory function intact  Psych: Cooperative; appropriate mood and affect    ASSESSMENT & PLAN:   HTN - well controlled  - BP at goal (114/78)  - Continue Atenolol 25 mg daily, HCTZ-Lisinopril 25-20mg daily  - Recommended continuing DASH diet, pt is agreeable with plan     Osteoarthritis  Suspected Right Carpal Tunnel Syndrome   - B/L Knee Arthroplasty w/ multiple revisions   - Pt followed by ortho; s/p Left Knee Fusion (2/2022), recovering well   - Is able to ambulate with walker, mostly uses wheelchair at home   - Recommended nightly right wrist brace until further evaluation of carpal tunnel by orthpedics  - we will refer to [facility] for further evaluation of suspected right carpal tunnel syndrome for possible conservative management    B/L UE/LE lesions; suspicious for keratosis pilaris - stable  - Reports having lesions since childhood  - Previously followed by outside facility Dermatologist  - States previous prescription of "mixed" ointment that assisted with flares/pruritis/pain though was too expensive     Hx of CVA  - Residual deficit of (left UE weakness)  - continue aspirin 81 daily  - Plavix discontinue review visit to provide mono antiplatelet therapy in setting of prior CVA with difficulty ambulating and fall risk; patient is agreeable with plan    Type II Diabetes Mellitus  - POC A1c today in clinic (6.6)  - patient reports eating sweets on frequent basis; counseled regarding limiting such ingestion in addition to opting for diet/zero options for sodas in which he is amenable  - unfortunately due to patient's disability and inability to exercise; we discussed above-mentioned dietary changes and repeat of A1c at next clinic appointment in x3 months  - we also " discussed that if A1c does not downtrend and/or worsens we will likely need to initiate metformin therapy    Tobacco User  Chronic Bronchitis  Asthma/reactive airway disease   - Reduced from 1/2 ppd to x4-5 cigarettes daily since   - 30 pack year smoker, not interested in smoking cessation assistance at this time   - PFTs (8/2022) suspicious for mild obstructive disease with 15% post-bronchodilator response   - continue rescue inhaler with albuterol p.r.n.   - prescribed Laba/ics maintenance inhaler (Breo Ellipta)   - prescribed LAMA therapy with (Spiriva)   - Annual Low-Dose CT Thorax (benign w/ emphysematous changes) (8/2023) - unremarkable  - elevated eosinophils on recent labs and priors  - pending IgE level    HLD  - fasting lipid profile at goal  - Continue Pravastatin 20 mg daily       Health Maintenance:  Immunization History   Administered Date(s) Administered    COVID-19, MRNA, LN-S, PF (Pfizer) (Purple Cap) 02/24/2021, 03/17/2021, 10/20/2021    Pneumococcal Conjugate - 13 Valent 05/30/2022    Tdap 05/30/2022    Zoster Recombinant 08/25/2022, 01/09/2023   Colonoscopy  12/2016  -- Cologuard negative (9/2022)  Low-Dose CT Scan - Up to Date (8/2023)  AAA screening - pending (ordered today    Return to clinic in 4 months    Stuart Singer MD  [facility], PGY-III      Answers submitted by the patient for this visit:  Review of Systems Questionnaire (Submitted on 6/24/2024)  activity change: No  unexpected weight change: No  neck pain: No  hearing loss: No  rhinorrhea: No  trouble swallowing: No  eye discharge: No  visual disturbance: No  chest tightness: No  wheezing: No  chest pain: No  palpitations: No  blood in stool: No  constipation: No  vomiting: No  diarrhea: No  polydipsia: No  polyuria: No  difficulty urinating: No  urgency: No  hematuria: No  joint swelling: No  arthralgias: No  headaches: No  weakness: No  confusion: No  dysphoric mood: No

## 2024-07-03 ENCOUNTER — HOSPITAL ENCOUNTER (OUTPATIENT)
Dept: RADIOLOGY | Facility: HOSPITAL | Age: 64
Discharge: HOME | End: 2024-07-03
Attending: STUDENT IN AN ORGANIZED HEALTH CARE EDUCATION/TRAINING PROGRAM
Payer: MEDICARE

## 2024-07-03 DIAGNOSIS — Z72.0 TOBACCO ABUSE: ICD-10-CM

## 2024-07-03 PROCEDURE — 76775 US EXAM ABDO BACK WALL LIM: CPT | Mod: TC

## 2024-07-12 ENCOUNTER — OFFICE VISIT (OUTPATIENT)
Dept: DERMATOLOGY | Facility: CLINIC | Age: 64
End: 2024-07-12
Payer: MEDICARE

## 2024-07-12 VITALS
OXYGEN SATURATION: 98 % | TEMPERATURE: 98 F | SYSTOLIC BLOOD PRESSURE: 114 MMHG | HEART RATE: 78 BPM | WEIGHT: 234 LBS | DIASTOLIC BLOOD PRESSURE: 71 MMHG | HEIGHT: 68 IN | BODY MASS INDEX: 35.46 KG/M2 | RESPIRATION RATE: 18 BRPM

## 2024-07-12 DIAGNOSIS — E85.4 LICHEN AMYLOIDOSIS: Primary | ICD-10-CM

## 2024-07-12 DIAGNOSIS — L99 LICHEN AMYLOIDOSIS: Primary | ICD-10-CM

## 2024-07-12 PROCEDURE — 99214 OFFICE O/P EST MOD 30 MIN: CPT | Mod: PBBFAC | Performed by: DERMATOLOGY

## 2024-07-12 NOTE — PROGRESS NOTES
"[facility] OFFICE VISIT NOTE  German Frank  [id number]  07/12/2024    Chief Complaint   Patient presents with    Follow-up     4 month follow up.       German Frank is a 63 y.o. male with a PMH of CVA (w/ residual LUE weakness)  presenting to [facility] clinic for follow up papular/pruritic rash located on BLE/BUE and trunk. Pt was seen in 3/2024, continue triamcinolone ointment which was initially started in 1/2024. Also using OTC CeraVe. Prior shave biopsy 1/2024 with pathology showing lichen/macular amyloidosis. Currently denies pruritus. States rash is clearing up a little on legs.      Review of Systems  See HPI    Blood pressure 114/71, pulse 78, temperature 97.7 °F (36.5 °C), temperature source Oral, resp. rate 18, height 5' 8" (1.727 m), weight 106.1 kg (234 lb), SpO2 98%.   Physical Exam  Gen: in no acute distress  Resp: breathing normally  Skin: diffuse papular hyperpigmented, non-pruritic rash bilateral lower extremities, bilateral upper extremities (L>R)          Current Medications:   Current Outpatient Medications   Medication Sig Dispense Refill    albuterol (VENTOLIN HFA) 90 mcg/actuation inhaler Inhale 2 puffs into the lungs every 6 (six) hours as needed for Wheezing. Rescue 6.7 g 2    aspirin (ECOTRIN) 81 MG EC tablet Take 1 tablet (81 mg total) by mouth once daily. 90 tablet 3    atenoloL (TENORMIN) 25 MG tablet Take 1 tablet (25 mg total) by mouth once daily. 90 tablet 3    cetirizine (ZYRTEC) 10 MG tablet Take 1 tablet (10 mg total) by mouth daily as needed. 90 tablet 3    cholecalciferol, vitamin D3, (VITAMIN D3) 50 mcg (2,000 unit) Cap capsule Take 1 capsule (2,000 Units total) by mouth once daily. 30 capsule 3    fluticasone-salmeterol diskus inhaler 100-50 mcg Inhale 1 puff into the lungs 2 (two) times daily. Controller 60 each 3    lisinopriL-hydrochlorothiazide (PRINZIDE,ZESTORETIC) 20-25 mg Tab Take 1 tablet by mouth once daily. 90 tablet 3    pravastatin (PRAVACHOL) 20 MG tablet " Take 1 tablet (20 mg total) by mouth once daily. 90 tablet 3    tiotropium (SPIRIVA) 18 mcg inhalation capsule Inhale 1 capsule (18 mcg total) into the lungs once daily. Controller 30 capsule 3    triamcinolone acetonide 0.1% (KENALOG) 0.1 % ointment Apply topically 2 (two) times daily as needed (apply topically to rash). 453.6 g 5     No current facility-administered medications for this visit.       Assessment:   1. Lichen amyloidosis            Plan:  Ddx:Lichen amyloidosis cutaneous form of amyloidosis 2/2 scratching   -Etiology of scratching likely multifactorial-->increased creatinine (now normalized) vs medications vs neuropathic 2/2 hx of CVA (will consider gabapentin)  -HIV, Hep panel, and spep ordered in 3/2024, not done - patient states he will have these done today  -Continue triamcinolone ointment BID and OTC Cerave    Return to clinic PRN      Vania Delgadillo MD  [facility], PRG-3  7/12/2024

## 2024-08-01 ENCOUNTER — OFFICE VISIT (OUTPATIENT)
Dept: URGENT CARE | Facility: CLINIC | Age: 64
End: 2024-08-01
Payer: MEDICARE

## 2024-08-01 VITALS
HEART RATE: 77 BPM | OXYGEN SATURATION: 100 % | BODY MASS INDEX: 35.46 KG/M2 | RESPIRATION RATE: 18 BRPM | WEIGHT: 234 LBS | DIASTOLIC BLOOD PRESSURE: 83 MMHG | SYSTOLIC BLOOD PRESSURE: 126 MMHG | HEIGHT: 68 IN | TEMPERATURE: 98 F

## 2024-08-01 DIAGNOSIS — J40 BRONCHITIS: ICD-10-CM

## 2024-08-01 DIAGNOSIS — J32.9 SINUSITIS, UNSPECIFIED CHRONICITY, UNSPECIFIED LOCATION: Primary | ICD-10-CM

## 2024-08-01 RX ORDER — DOXYCYCLINE 100 MG/1
100 CAPSULE ORAL 2 TIMES DAILY
Qty: 14 CAPSULE | Refills: 0 | Status: SHIPPED | OUTPATIENT
Start: 2024-08-01 | End: 2024-08-08

## 2024-08-01 RX ORDER — FLUTICASONE PROPIONATE 50 MCG
1 SPRAY, SUSPENSION (ML) NASAL 2 TIMES DAILY
Qty: 9.9 ML | Refills: 0 | Status: SHIPPED | OUTPATIENT
Start: 2024-08-01

## 2024-08-01 RX ORDER — BETAMETHASONE SODIUM PHOSPHATE AND BETAMETHASONE ACETATE 3; 3 MG/ML; MG/ML
6 INJECTION, SUSPENSION INTRA-ARTICULAR; INTRALESIONAL; INTRAMUSCULAR; SOFT TISSUE
Status: COMPLETED | OUTPATIENT
Start: 2024-08-01 | End: 2024-08-01

## 2024-08-01 RX ADMIN — BETAMETHASONE SODIUM PHOSPHATE AND BETAMETHASONE ACETATE 6 MG: 3; 3 INJECTION, SUSPENSION INTRA-ARTICULAR; INTRALESIONAL; INTRAMUSCULAR; SOFT TISSUE at 10:08

## 2024-08-01 NOTE — PATIENT INSTRUCTIONS
Plan:   Medications sent to pharmacy  Stop using the over-the-counter nasal spray.  A nasal spray has been prescribed for you to use  Continue taking Zyrtec  Start antibiotics today  Rest and hydrate  Monitor for fever  If your symptoms worsen return to clinic or seek medical attention immediately

## 2024-08-01 NOTE — PROGRESS NOTES
"Subjective:      Patient ID: German Frank is a 63 y.o. male.    Vitals:  height is 5' 8" (1.727 m) and weight is 106.1 kg (234 lb). His temperature is 98.2 °F (36.8 °C). His blood pressure is 126/83 and his pulse is 77. His respiration is 18 and oxygen saturation is 100%.     Chief Complaint: Sinus Problem     Patient is a 63 y.o. male who presents to urgent care with complaints of sinus pressure and congestion, cough,headache about 2 weeks now.  Denies any fever vomiting diarrhea.  States he does have COPD and has shortness of breath at baseline but it has not worsened.  Patient smokes.  Has been taking Zyrtec and something similar to Afrin.    Sinus Problem  Associated symptoms include congestion, coughing and sinus pressure.     Constitution: Negative.   HENT:  Positive for congestion and sinus pressure.    Neck: neck negative.   Cardiovascular: Negative.    Eyes: Negative.    Respiratory:  Positive for cough.    Gastrointestinal: Negative.    Genitourinary: Negative.    Musculoskeletal: Negative.    Skin: Negative.    Allergic/Immunologic: Negative.    Neurological: Negative.    Hematologic/Lymphatic: Negative.       Objective:     Physical Exam   Constitutional: He is oriented to person, place, and time. He appears well-developed. He is cooperative.  Non-toxic appearance. He does not appear ill. No distress.   HENT:   Head: Normocephalic and atraumatic.   Ears:   Right Ear: Hearing and external ear normal.   Left Ear: Hearing and external ear normal.   Mouth/Throat: Mucous membranes are normal. No oropharyngeal exudate or posterior oropharyngeal erythema (postnasal drip).   Eyes: Conjunctivae and lids are normal.   Neck: Trachea normal and phonation normal. Neck supple. No edema present. No erythema present. No neck rigidity present.   Cardiovascular: Normal rate, regular rhythm and normal heart sounds.   Pulmonary/Chest: Effort normal. No stridor. No respiratory distress. He has no decreased breath " sounds. He has no wheezes. He has rhonchi. He has no rales.   Abdominal: Normal appearance.   Neurological: He is alert and oriented to person, place, and time. He exhibits normal muscle tone.   Skin: Skin is warm, intact and not diaphoretic.   Psychiatric: His speech is normal and behavior is normal. Mood, judgment and thought content normal.   Nursing note and vitals reviewed.         Previous History      Review of patient's allergies indicates:   Allergen Reactions    Strawberry Swelling       Past Medical History:   Diagnosis Date    Essential (primary) hypertension     High cholesterol     Hyperlipidemia     Morbid obesity     Rheumatoid arthritis, unspecified     Stroke     Unspecified osteoarthritis, unspecified site      Current Outpatient Medications   Medication Instructions    albuterol (VENTOLIN HFA) 90 mcg/actuation inhaler 2 puffs, Inhalation, Every 6 hours PRN, Rescue    aspirin (ECOTRIN) 81 mg, Oral, Daily    atenoloL (TENORMIN) 25 mg, Oral, Daily    cetirizine (ZYRTEC) 10 mg, Oral, Daily PRN    cholecalciferol (vitamin D3) (VITAMIN D3) 2,000 Units, Oral, Daily    doxycycline (MONODOX) 100 mg, Oral, 2 times daily    fluticasone propionate (FLONASE) 50 mcg, Each Nostril, 2 times daily    fluticasone-salmeterol diskus inhaler 100-50 mcg 1 puff, Inhalation, 2 times daily, Controller    lisinopriL-hydrochlorothiazide (PRINZIDE,ZESTORETIC) 20-25 mg Tab 1 tablet, Oral, Daily    pravastatin (PRAVACHOL) 20 mg, Oral, Daily    tiotropium (SPIRIVA) 18 mcg, Inhalation, Daily, Controller    triamcinolone acetonide 0.1% (KENALOG) 0.1 % ointment Topical (Top), 2 times daily PRN     Past Surgical History:   Procedure Laterality Date    JOINT REPLACEMENT  02/07/2023    left total knee      right total knee      TOTAL KNEE ARTHROPLASTY Bilateral      Family History   Problem Relation Name Age of Onset    COPD Mother Breann saleslito     Hypertension Father Onur rdzfeliciano     COPD Father Onur rdzfeliciano     Stroke  "Brother Anselmo miller        Social History     Tobacco Use    Smoking status: Every Day     Current packs/day: 0.50     Average packs/day: 0.5 packs/day for 47.0 years (23.5 ttl pk-yrs)     Types: Cigarettes     Start date: 8/16/1977    Smokeless tobacco: Never    Tobacco comments:     States smoking 4-5 cigarettes per day now   Substance Use Topics    Alcohol use: Not Currently    Drug use: Not Currently     Types: Marijuana        Physical Exam      Vital Signs Reviewed   /83   Pulse 77   Temp 98.2 °F (36.8 °C)   Resp 18   Ht 5' 8" (1.727 m)   Wt 106.1 kg (234 lb)   SpO2 100%   BMI 35.58 kg/m²        Procedures    Procedures     Labs     Results for orders placed or performed in visit on 07/03/24   Comprehensive Metabolic Panel   Result Value Ref Range    Sodium 138 136 - 145 mmol/L    Potassium 3.7 3.5 - 5.1 mmol/L    Chloride 101 98 - 107 mmol/L    CO2 25 23 - 31 mmol/L    Glucose 128 (H) 82 - 115 mg/dL    Blood Urea Nitrogen 18.8 8.4 - 25.7 mg/dL    Creatinine 1.13 0.73 - 1.18 mg/dL    Calcium 9.6 8.8 - 10.0 mg/dL    Protein Total 6.9 5.8 - 7.6 gm/dL    Albumin 3.8 3.4 - 4.8 g/dL    Globulin 3.1 2.4 - 3.5 gm/dL    Albumin/Globulin Ratio 1.2 1.1 - 2.0 ratio    Bilirubin Total 0.5 <=1.5 mg/dL    ALP 73 40 - 150 unit/L    ALT 15 0 - 55 unit/L    AST 16 5 - 34 unit/L    eGFR >60 mL/min/1.73/m2    Anion Gap 12.0 mEq/L    BUN/Creatinine Ratio 17    Lipid Panel   Result Value Ref Range    Cholesterol Total 151 <=200 mg/dL    HDL Cholesterol 41 35 - 60 mg/dL    Triglyceride 92 34 - 140 mg/dL    Cholesterol/HDL Ratio 4 0 - 5    Very Low Density Lipoprotein 18     LDL Cholesterol 92.00 50.00 - 140.00 mg/dL   TSH   Result Value Ref Range    TSH 1.612 0.350 - 4.940 uIU/mL   Urinalysis   Result Value Ref Range    Color, UA Light-Yellow Yellow, Light-Yellow, Colorless, Straw, Dark-Yellow    Appearance, UA Clear Clear    Specific Gravity, UA 1.015 1.005 - 1.030    pH, UA 5.5 5.0 - 8.5    Protein, UA Negative " Negative    Glucose, UA Normal Negative, Normal    Ketones, UA Negative Negative    Blood, UA Negative Negative    Bilirubin, UA Negative Negative    Urobilinogen, UA Normal 0.2, 1.0, Normal    Nitrites, UA Negative Negative    Leukocyte Esterase, UA Negative Negative    RBC, UA 0-5 None Seen, 0-2, 3-5, 0-5 /HPF    WBC, UA 0-5 None Seen, 0-2, 3-5, 0-5 /HPF    Bacteria, UA None Seen None Seen, Trace /HPF    Squamous Epithelial Cells, UA None Seen None Seen /HPF    Mucous, UA Trace (A) None Seen /LPF   Microalbumin/Creatinine Ratio, Urine   Result Value Ref Range    Urine Microalbumin <5.0 <=30.0 ug/mL    Urine Creatinine 115.3 63.0 - 166.0 mg/dL    Microalbumin Creatinine Ratio     CBC with Differential   Result Value Ref Range    WBC 8.66 4.50 - 11.50 x10(3)/mcL    RBC 5.60 4.70 - 6.10 x10(6)/mcL    Hgb 14.5 14.0 - 18.0 g/dL    Hct 45.3 42.0 - 52.0 %    MCV 80.9 80.0 - 94.0 fL    MCH 25.9 (L) 27.0 - 31.0 pg    MCHC 32.0 (L) 33.0 - 36.0 g/dL    RDW 16.3 11.5 - 17.0 %    Platelet 301 130 - 400 x10(3)/mcL    MPV 10.7 (H) 7.4 - 10.4 fL    Neut % 63.7 %    Lymph % 17.0 %    Mono % 9.0 %    Eos % 8.9 %    Basophil % 0.7 %    Lymph # 1.47 0.6 - 4.6 x10(3)/mcL    Neut # 5.52 2.1 - 9.2 x10(3)/mcL    Mono # 0.78 0.1 - 1.3 x10(3)/mcL    Eos # 0.77 0 - 0.9 x10(3)/mcL    Baso # 0.06 <=0.2 x10(3)/mcL    IG# 0.06 (H) 0 - 0.04 x10(3)/mcL    IG% 0.7 %    NRBC% 0.0 %       Assessment:     1. Sinusitis, unspecified chronicity, unspecified location    2. Bronchitis        Plan:   Medications sent to pharmacy  Stop using the over-the-counter nasal spray.  A nasal spray has been prescribed for you to use  Continue taking Zyrtec  Start antibiotics today  Rest and hydrate  Monitor for fever  If your symptoms worsen return to clinic or seek medical attention immediately    Sinusitis, unspecified chronicity, unspecified location    Bronchitis    Other orders  -     betamethasone acetate-betamethasone sodium phosphate injection 6 mg  -      fluticasone propionate (FLONASE) 50 mcg/actuation nasal spray; 1 spray (50 mcg total) by Each Nostril route 2 (two) times a day.  Dispense: 9.9 mL; Refill: 0  -     doxycycline (MONODOX) 100 MG capsule; Take 1 capsule (100 mg total) by mouth 2 (two) times daily. for 7 days  Dispense: 14 capsule; Refill: 0

## 2024-08-08 DIAGNOSIS — R21 RASH: ICD-10-CM

## 2024-08-14 RX ORDER — TRIAMCINOLONE ACETONIDE 1 MG/G
OINTMENT TOPICAL 2 TIMES DAILY PRN
Qty: 453.6 G | Refills: 5 | Status: SHIPPED | OUTPATIENT
Start: 2024-08-14

## 2024-08-28 ENCOUNTER — TELEPHONE (OUTPATIENT)
Dept: ORTHOPEDICS | Facility: CLINIC | Age: 64
End: 2024-08-28
Payer: MEDICARE

## 2024-08-28 NOTE — TELEPHONE ENCOUNTER
Patient called for a excuse for jury duty. We have not seen him since 2/29/2024. I call him and let him know we can not give him a excuse.    I spoke to patient and he said ok.